# Patient Record
Sex: FEMALE | Race: ASIAN | NOT HISPANIC OR LATINO | Employment: FULL TIME | URBAN - METROPOLITAN AREA
[De-identification: names, ages, dates, MRNs, and addresses within clinical notes are randomized per-mention and may not be internally consistent; named-entity substitution may affect disease eponyms.]

---

## 2017-07-06 ENCOUNTER — OFFICE VISIT (OUTPATIENT)
Dept: FAMILY MEDICINE | Facility: CLINIC | Age: 27
End: 2017-07-06
Payer: COMMERCIAL

## 2017-07-06 VITALS
DIASTOLIC BLOOD PRESSURE: 74 MMHG | HEIGHT: 62 IN | HEART RATE: 119 BPM | WEIGHT: 124 LBS | OXYGEN SATURATION: 100 % | SYSTOLIC BLOOD PRESSURE: 116 MMHG | TEMPERATURE: 98.5 F | BODY MASS INDEX: 22.82 KG/M2

## 2017-07-06 DIAGNOSIS — H66.90 ACUTE OTITIS MEDIA, UNSPECIFIED LATERALITY, UNSPECIFIED OTITIS MEDIA TYPE: Primary | ICD-10-CM

## 2017-07-06 DIAGNOSIS — H61.21 IMPACTED CERUMEN OF RIGHT EAR: ICD-10-CM

## 2017-07-06 DIAGNOSIS — R07.0 THROAT PAIN: ICD-10-CM

## 2017-07-06 PROCEDURE — 69209 REMOVE IMPACTED EAR WAX UNI: CPT | Performed by: PHYSICIAN ASSISTANT

## 2017-07-06 PROCEDURE — 99213 OFFICE O/P EST LOW 20 MIN: CPT | Mod: 25 | Performed by: PHYSICIAN ASSISTANT

## 2017-07-06 RX ORDER — AMOXICILLIN 500 MG/1
500 CAPSULE ORAL 2 TIMES DAILY
Qty: 20 CAPSULE | Refills: 0 | Status: SHIPPED | OUTPATIENT
Start: 2017-07-06 | End: 2017-07-16

## 2017-07-06 NOTE — PROGRESS NOTES
SUBJECTIVE:      Alexsandra Westbrook is a 26 year old female who presents to clinic today with  for the following health issues:         Acute Illness   Acute illness concerns: sore throat, ear pain  Onset: 1 week    Fever: no    Chills/Sweats: no    Headache (location?): no    Sinus Pressure:no    Conjunctivitis:  no    Ear Pain: YES: right    Rhinorrhea: no    Congestion: no    Sore Throat: YES- only when swallowing   Cough: no    Wheeze: no    Decreased Appetite: no    Nausea: no    Vomiting: no    Diarrhea:  no    Dysuria/Freq.: no    Fatigue/Achiness: no    Sick/Strep Exposure: no   Therapies Tried and outcome: none     Sore throat and left ear pain for the last week.  Throat hurts more with swallowing, especially salty and sour foods.  She denies feeling congested or having rhinorrhea, but does note feelings of PND.   Has been sneezing more than usual as well.     Her right ear is painful.   Feels like the pain is internal.   Occasional itching, feels watery, but no discharge.   Does note pressure in ear.   No decreased hearing or drainage.  Last ear infection was in February.   She does note a cold approximately a month ago.     Denies cough, SOB, decreased appetite, nausea and vomiting.   No sick exposures.        Problem list and histories reviewed & adjusted, as indicated.  Additional history: as documented     Patient Active Problem List   Diagnosis     CARDIOVASCULAR SCREENING; LDL GOAL LESS THAN 160     Insomnia     Female infertility     Vitamin D deficiency     PCOS (polycystic ovarian syndrome)     Indication for care in labor or delivery     Postpartum state     Past Surgical History:   Procedure Laterality Date     DILATION AND CURETTAGE SUCTION N/A 4/7/2015    Procedure: DILATION AND CURETTAGE SUCTION;  Surgeon: Hafsa Myers MD;  Location:  OR       Social History   Substance Use Topics     Smoking status: Never Smoker     Smokeless tobacco: Never Used     Alcohol use No     Family  "History   Problem Relation Age of Onset     Family History Negative Mother      Family History Negative Father      DIABETES Maternal Grandmother      Coronary Artery Disease No family hx of      Hypertension No family hx of      Hyperlipidemia No family hx of      CEREBROVASCULAR DISEASE Maternal Grandfather      old age     Breast Cancer No family hx of      Colon Cancer No family hx of      Thyroid Disease Mother      enlarged, hypothyroidism         Current Outpatient Prescriptions   Medication Sig Dispense Refill     amoxicillin (AMOXIL) 500 MG capsule Take 1 capsule (500 mg) by mouth 2 times daily for 10 days 20 capsule 0     norelgestromin-ethinyl estradiol (ORTHO EVRA) 150-35 MCG/24HR patch Place 1 patch onto the skin once a week (Patient not taking: Reported on 7/6/2017) 9 patch 3     Omega-3 Fatty Acids (OMEGA-3 FISH OIL PO)        Prenatal Vit-Fe Fumarate-FA (PRENATAL MULTIVITAMIN  PLUS IRON) 27-0.8 MG TABS        No Known Allergies     Reviewed and updated as needed this visit by clinical staff        Reviewed and updated as needed this visit by Provider           ROS:  Constitutional, HEENT, cardiovascular, pulmonary, gi and gu systems are negative, except as otherwise noted.    OBJECTIVE:     /74  Pulse 119  Temp 98.5  F (36.9  C) (Oral)  Ht 5' 2.25\" (1.581 m)  Wt 124 lb (56.2 kg)  SpO2 100%  BMI 22.5 kg/m2   Body mass index is 22.5 kg/(m^2).  GENERAL: healthy, alert and no distress  EYES: Eyes grossly normal to inspection, PERRL and conjunctivae and sclerae normal  HENT: normal cephalic/atraumatic, right ear: occluded with wax, TM visualized after irrigation and was red, dull, and had effusion, left ear: normal: no effusions, no erythema, normal landmarks, nose and mouth without ulcers or lesions, oropharynx clear, oral mucous membranes moist and cobblestoning of oropharynx.   NECK: no adenopathy, no asymmetry, masses, or scars and thyroid normal to palpation  RESP: lungs clear to " auscultation - no rales, rhonchi or wheezes  CV: regular rate and rhythm, normal S1 S2, no S3 or S4, no murmur, click or rub, no peripheral edema and peripheral pulses strong    Diagnostic Test Results:  none     ASSESSMENT/PLAN:     1. Acute otitis media, unspecified laterality, unspecified otitis media type  - amoxicillin (AMOXIL) 500 MG capsule; Take 1 capsule (500 mg) by mouth 2 times daily for 10 days  Dispense: 20 capsule; Refill: 0    Follow up if symptoms do not improve or worsen.     Hector THEODORE

## 2017-07-06 NOTE — MR AVS SNAPSHOT
"              After Visit Summary   7/6/2017    Alexsandra Westbrook    MRN: [id number]           Patient Information     Date Of Birth          1990        Visit Information        Provider Department      7/6/2017 2:30 PM Elma Hui PA-C; [employer] [facility]        Today's Diagnoses     Acute otitis media, unspecified laterality, unspecified otitis media type    -  1       Follow-ups after your visit        Who to contact     If you have questions or need follow up information about today's clinic visit or your schedule please [facility] directly at [phone].  Normal or non-critical lab and imaging results will be communicated to you by MyChart, letter or phone within 4 business days after the clinic has received the results. If you do not hear from us within 7 days, please contact the clinic through [employer] or phone. If you have a critical or abnormal lab result, we will notify you by phone as soon as possible.  Submit refill requests through [employer] or call your pharmacy and they will forward the refill request to us. Please allow 3 business days for your refill to be completed.          Additional Information About Your Visit        MyChart Information     [employer] gives you secure access to your electronic health record. If you see a primary care provider, you can also send messages to your care team and make appointments. If you have questions, please call your primary care clinic.  If you do not have a primary care provider, please call [phone] and they will assist you.        Care EveryWhere ID     This is your Care EveryWhere ID. This could be used by other organizations to access your [facility] medical records  [id number]        Your Vitals Were     Pulse Temperature Height Pulse Oximetry BMI (Body Mass Index)       119 98.5  F (36.9  C) (Oral) 5' 2.25\" (1.581 m) 100% 22.5 kg/m2        Blood Pressure from Last 3 Encounters:   07/06/17 116/74 "   10/03/16 110/72   06/06/16 118/76    Weight from Last 3 Encounters:   07/06/17 124 lb (56.2 kg)   10/03/16 130 lb (59 kg)   06/06/16 132 lb 8 oz (60.1 kg)              Today, you had the following     No orders found for display         Today's Medication Changes          These changes are accurate as of: 7/6/17  3:38 PM.  If you have any questions, ask your nurse or doctor.               Start taking these medicines.        Dose/Directions    amoxicillin 500 MG capsule   Commonly known as:  AMOXIL   Used for:  Acute otitis media, unspecified laterality, unspecified otitis media type   Started by:  Elma Hui PA-C        Dose:  500 mg   Take 1 capsule (500 mg) by mouth 2 times daily for 10 days   Quantity:  20 capsule   Refills:  0            Where to get your medicines      These medications were sent to [id number] - [place], [place] - [address] AT [place] [address]  [address] [place] [address]     Phone:  [phone]     amoxicillin 500 MG capsule                Primary Care Provider Office Phone # Fax #    Zo Ferrara PA-C [phone]       [facility] [address] [place] [address]        Equal Access to Services     GIO PETERSEN AH: Hadii aad ku hadasho Soomaali, waaxda luqadaha, qaybta kaalmada adeegyada, waxay idiin hayaan stephan kharacurly patel ah. So [facility] [phone].    ATENCIÓN: Si habla español, tiene a barnes disposición servicios gratuitos de asistencia lingüística. Llame al [phone].    We comply with applicable federal civil rights laws and [place] laws. We do not discriminate on the basis of race, color, national origin, age, disability sex, sexual orientation or gender identity.            Thank you!     Thank you for choosing [facility]  for your care. Our goal is always to provide you with excellent care. Hearing back from our patients is one way we can continue to improve our services. Please  take a few minutes to complete the written survey that you may receive in the mail after your visit with us. Thank you!             Your Updated Medication List - Protect others around you: Learn how to safely use, store and throw away your medicines at www.disposemymeds.org.          This list is accurate as of: 7/6/17  3:38 PM.  Always use your most recent med list.                   Brand Name Dispense Instructions for use Diagnosis    amoxicillin 500 MG capsule    AMOXIL    20 capsule    Take 1 capsule (500 mg) by mouth 2 times daily for 10 days    Acute otitis media, unspecified laterality, unspecified otitis media type       norelgestromin-ethinyl estradiol 150-35 MCG/24HR patch    ORTHO EVRA    9 patch    Place 1 patch onto the skin once a week    Encounter for initial prescription of transdermal patch hormonal contraceptive device       OMEGA-3 FISH OIL PO           prenatal multivitamin  plus iron 27-0.8 MG Tabs per tablet

## 2017-07-06 NOTE — NURSING NOTE
"Chief Complaint   Patient presents with     Ear Problem       Initial /74  Pulse 119  Temp 98.5  F (36.9  C) (Oral)  Ht 5' 2.25\" (1.581 m)  Wt 124 lb (56.2 kg)  SpO2 100%  BMI 22.5 kg/m2 Estimated body mass index is 22.5 kg/(m^2) as calculated from the following:    Height as of this encounter: 5' 2.25\" (1.581 m).    Weight as of this encounter: 124 lb (56.2 kg).  Medication Reconciliation: complete     Leena Chaparro MA      "

## 2017-07-06 NOTE — PROGRESS NOTES
SUBJECTIVE:                                                    Alexsandra Westbrook is a 26 year old female who presents to clinic today for the following health issues:    Acute Illness   Acute illness concerns: sore throat, ear pain  Onset: 1 week    Fever: no    Chills/Sweats: no    Headache (location?): no    Sinus Pressure:no    Conjunctivitis:  no    Ear Pain: YES: right    Rhinorrhea: no    Congestion: no    Sore Throat: YES- only when swallowing     Cough: no    Wheeze: no    Decreased Appetite: no    Nausea: no    Vomiting: no    Diarrhea:  no    Dysuria/Freq.: no    Fatigue/Achiness: no    Sick/Strep Exposure: no     Therapies Tried and outcome: none    Patient reports one week history of sore throat and ear pressure  Symptoms have been pretty consistent over the past week.  Pain with swallowing. Salty and sour foods irritate her throat.   Ear itches, but hasn't had discharge. Reports intermittent popping but no hearing loss.   Reports history of ear infection in February.    Sneezing more than usual.    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    Problem list and histories reviewed & adjusted, as indicated.  Additional history: as documented    Patient Active Problem List   Diagnosis     CARDIOVASCULAR SCREENING; LDL GOAL LESS THAN 160     Insomnia     Female infertility     Vitamin D deficiency     PCOS (polycystic ovarian syndrome)     Indication for care in labor or delivery     Postpartum state     Past Surgical History:   Procedure Laterality Date     DILATION AND CURETTAGE SUCTION N/A 4/7/2015    Procedure: DILATION AND CURETTAGE SUCTION;  Surgeon: Hafsa Myers MD;  Location:  OR       Social History   Substance Use Topics     Smoking status: Never Smoker     Smokeless tobacco: Never Used     Alcohol use No     Family History   Problem Relation Age of Onset     Family History Negative Mother      Family History Negative  "Father      DIABETES Maternal Grandmother      Coronary Artery Disease No family hx of      Hypertension No family hx of      Hyperlipidemia No family hx of      CEREBROVASCULAR DISEASE Maternal Grandfather      old age     Breast Cancer No family hx of      Colon Cancer No family hx of      Thyroid Disease Mother      enlarged, hypothyroidism         Current Outpatient Prescriptions   Medication Sig Dispense Refill     amoxicillin (AMOXIL) 500 MG capsule Take 1 capsule (500 mg) by mouth 2 times daily for 10 days 20 capsule 0     norelgestromin-ethinyl estradiol (ORTHO EVRA) 150-35 MCG/24HR patch Place 1 patch onto the skin once a week (Patient not taking: Reported on 7/6/2017) 9 patch 3     Omega-3 Fatty Acids (OMEGA-3 FISH OIL PO)        Prenatal Vit-Fe Fumarate-FA (PRENATAL MULTIVITAMIN  PLUS IRON) 27-0.8 MG TABS        No Known Allergies    Reviewed and updated as needed this visit by clinical staff       Reviewed and updated as needed this visit by Provider         ROS:  Constitutional, HEENT, cardiovascular, pulmonary, gi and gu systems are negative, except as otherwise noted.    OBJECTIVE:     /74  Pulse 119  Temp 98.5  F (36.9  C) (Oral)  Ht 5' 2.25\" (1.581 m)  Wt 124 lb (56.2 kg)  SpO2 100%  BMI 22.5 kg/m2  Body mass index is 22.5 kg/(m^2).  GENERAL: healthy, alert and no distress  EYES: Eyes grossly normal to inspection, PERRL and conjunctivae and sclerae normal  HENT: normal cephalic/atraumatic, right ear: Occluded with wax. TM visualized after irrigation, and is erythematous and dull, nose and mouth without ulcers or lesions, oropharynx clear and oral mucous membranes moist  NECK: no adenopathy, no asymmetry, masses, or scars and thyroid normal to palpation  RESP: lungs clear to auscultation - no rales, rhonchi or wheezes  CV: regular rate and rhythm, normal S1 S2, no S3 or S4, no murmur, click or rub, no peripheral edema and peripheral pulses strong  SKIN: no suspicious lesions or " mike    Diagnostic Test Results:  none     ASSESSMENT/PLAN:     1. Acute otitis media, unspecified laterality, unspecified otitis media type  Patient advised to complete full course of antibiotic. Follow-up if no improvement with treatment.   - amoxicillin (AMOXIL) 500 MG capsule; Take 1 capsule (500 mg) by mouth 2 times daily for 10 days  Dispense: 20 capsule; Refill: 0    2. Impacted cerumen of right ear  Irrigation performed to allow for visualization of R TM, due to R sided ear discomfort. Irrigation successful at clearing impaction. See physical exam.  -HC REMOVAL IMPACTED CERUMEN IRRIGATION/LVG UNILAT    3. Throat pain  Likely related to URI/postnasal drainage. Amoxicillin would cover for strep, so strep testing not performed.    See Patient Instructions    I performed a history and physical examination in addition to that performed by the student. The documentation above reflects my personal history and physical findings.    Elma Hui PA-C  [facility]

## 2017-08-12 ENCOUNTER — HOSPITAL ENCOUNTER (EMERGENCY)
Facility: CLINIC | Age: 27
Discharge: HOME | End: 2017-08-12
Attending: EMERGENCY MEDICINE | Admitting: EMERGENCY MEDICINE
Payer: COMMERCIAL

## 2017-08-12 ENCOUNTER — APPOINTMENT (OUTPATIENT)
Dept: ULTRASOUND IMAGING | Facility: CLINIC | Age: 27
End: 2017-08-12
Attending: EMERGENCY MEDICINE
Payer: COMMERCIAL

## 2017-08-12 VITALS
OXYGEN SATURATION: 100 % | TEMPERATURE: 98.3 F | HEART RATE: 86 BPM | DIASTOLIC BLOOD PRESSURE: 79 MMHG | SYSTOLIC BLOOD PRESSURE: 121 MMHG | RESPIRATION RATE: 16 BRPM

## 2017-08-12 DIAGNOSIS — O20.0 THREATENED MISCARRIAGE IN EARLY PREGNANCY: ICD-10-CM

## 2017-08-12 LAB
B-HCG SERPL-ACNC: 8 IU/L (ref 0–5)
BASOPHILS # BLD AUTO: 0 10E9/L (ref 0–0.2)
BASOPHILS NFR BLD AUTO: 0.4 %
DIFFERENTIAL METHOD BLD: NORMAL
EOSINOPHIL # BLD AUTO: 0.1 10E9/L (ref 0–0.7)
EOSINOPHIL NFR BLD AUTO: 2.6 %
ERYTHROCYTE [DISTWIDTH] IN BLOOD BY AUTOMATED COUNT: 11.8 % (ref 10–15)
HCT VFR BLD AUTO: 40.3 % (ref 35–47)
HGB BLD-MCNC: 13.3 G/DL (ref 11.7–15.7)
IMM GRANULOCYTES # BLD: 0 10E9/L (ref 0–0.4)
IMM GRANULOCYTES NFR BLD: 0.2 %
LYMPHOCYTES # BLD AUTO: 1.9 10E9/L (ref 0.8–5.3)
LYMPHOCYTES NFR BLD AUTO: 38.2 %
MCH RBC QN AUTO: 29.3 PG (ref 26.5–33)
MCHC RBC AUTO-ENTMCNC: 33 G/DL (ref 31.5–36.5)
MCV RBC AUTO: 89 FL (ref 78–100)
MONOCYTES # BLD AUTO: 0.4 10E9/L (ref 0–1.3)
MONOCYTES NFR BLD AUTO: 8 %
NEUTROPHILS # BLD AUTO: 2.6 10E9/L (ref 1.6–8.3)
NEUTROPHILS NFR BLD AUTO: 50.6 %
NRBC # BLD AUTO: 0 10*3/UL
NRBC BLD AUTO-RTO: 0 /100
PLATELET # BLD AUTO: 365 10E9/L (ref 150–450)
RBC # BLD AUTO: 4.54 10E12/L (ref 3.8–5.2)
WBC # BLD AUTO: 5 10E9/L (ref 4–11)

## 2017-08-12 PROCEDURE — 00000159 ZZHCL STATISTIC H-SEND OUTS PREP: Performed by: EMERGENCY MEDICINE

## 2017-08-12 PROCEDURE — 99284 EMERGENCY DEPT VISIT MOD MDM: CPT | Mod: 25

## 2017-08-12 PROCEDURE — 88305 TISSUE EXAM BY PATHOLOGIST: CPT | Performed by: EMERGENCY MEDICINE

## 2017-08-12 PROCEDURE — 36415 COLL VENOUS BLD VENIPUNCTURE: CPT | Performed by: EMERGENCY MEDICINE

## 2017-08-12 PROCEDURE — 84702 CHORIONIC GONADOTROPIN TEST: CPT | Performed by: EMERGENCY MEDICINE

## 2017-08-12 PROCEDURE — 76801 OB US < 14 WKS SINGLE FETUS: CPT

## 2017-08-12 PROCEDURE — 88305 TISSUE EXAM BY PATHOLOGIST: CPT | Mod: 26 | Performed by: EMERGENCY MEDICINE

## 2017-08-12 PROCEDURE — 85025 COMPLETE CBC W/AUTO DIFF WBC: CPT | Performed by: EMERGENCY MEDICINE

## 2017-08-12 ASSESSMENT — ENCOUNTER SYMPTOMS: ABDOMINAL PAIN: 0

## 2017-08-12 NOTE — DISCHARGE INSTRUCTIONS
"Please make an appointment to follow up with your primary care provider OR [facility] [phone] in 2-3 days for repeat HCG and possible ultrasound.    Return to ER immediately if you develop: worsening bleeding (>1 pad or tampon per hour), severe abdominal or pelvic pain, new lightheadedness/dizziness/shortness of breath, Fever > 101, persistent nausea or vomiting OR you have any other concerns about your health.          S?y Audelia Có Th? X?y Ra [Possible Miscarriage, Threatened ]  V ào giai ? o?n ? ?u c?a th?i k? segun [place] ( ba tháng ? ?u), vi?c ch?y máu nh? không ph?i là ?i ?u b?t th? ?ng. ?i ?u này có th? là hoàn toàn bình th ? ?ng. Nh?ng n ?u ch?y máu ida?u ho? c ?au b ?ng tr?m tr?ng, ?ó có th ? là d?u hi?u s?m c?a s? s?y [place] (miscarriage). \"S?y [place]\" ngh?a là m?t [place] k? m?t cách b?t ng?.  Shirin shanelle?ng phân n?a s? b?nh nhân b? ch?y máu ho? c ?au b ?ng v ào giai ? o?n ? ?u c?a th?i k? segun [place], nh?ng tri?u ch?ng này s? ng?ng và [place] k? s? ti?p t?c m?t cách bình th ? ?ng. Ирина nhiên, shirin phân n?a các tr? ?ng h?p, s?y [place] s? x?y ra. S?y [place] có th? x?y ra do ida?u nguyên nhân khác nhau. Nh?ng nguyên nhân này anayeli g?m ida? m trùng ?? ?ng ti?u ho?c ida?m trùng vùng ch?u, [place] ida phát tri?n b?t bình th ? ?ng, cú ?á nh trúng vùng b?ng phía d? ?i (d? dày). Shirin ph?n l? n tr? ?ng h?p, không th? tìm ra nguyên nhân nào. Hãy yên tâm r? ng ? i?u này không ph?i là k?t qu? c?a b?t c? vi?c gì mà quý v? ? ã làm nate và s? không can thi?p vào kh? n?ng segun [place] c ?a quý v? shirin t??ng martínez .  Ch?m Sóc T  ?I BRIDGETTE: ? ? c?i thi? n c? may gi ? l?i cái [place] này, quý v? ph?i th?c hi?n nh? ng ?i ? u gerardo ?ây :  1. N?m ngh? trên gi? ?ng cho ? ?n khi h? t ?au và ch ?y máu.  2. Tránh giao h?p shirin vòng 3 tu?n l? k? ti?p.  3. ? ?ng dùng vòi r?a âm ? ?o ho?c dùng b ? ng v? sinh b?ng bông th?m ? ?t vào âm ? ?o.  Ti?p T?c [place] Dõi  L?y h?n v?i bác s? c?a quý v? shirin tu?n l? k? ti?p, ho?c [place] s? h? ?ng " d?n c?a nhân viên chúng tôi.  [L?U Ý: N?u quý v? ? ã siêu âm, bác s? chuyên angela s? xem l?i vi?c này. Quý v? s? ?? ?c thông báo v? b?t k? khám phá m?i nào có th? ? nh h? ? ng ? ?n vi?c ch ?m sóc c ?a quý v?.]  N?u x?y ra b?t c? ?i?u nào gerardo ?ây hãy L?P T?C ?I?U TR? Y T?:    Ch?y máu âm ? ?o ho? c ?au quá ba ngày    Ch?y máu âm ? ?o ida?u (th?m m?t mi? ng b?ng m ?i gi? shirin vòng ba gi?)    Sô?t 100.4 F (38 C) ho??c lundberg h?n, ho??c dante chi? dâ?n cu?a ba?c si? cu?a erica? vi?    ? au ida?u    Y?u ?t, chóng m?t ho?c ng?t x?u    Ti?t ra b?t c? ch?t gì gi? ng nh? d ?ch mô: màng màu h?ng ho?c xám ho?c ch?t r?n (l? y và segun ? ?n bác s?)  Date Last Reviewed: 4/21/2014 2000-2017 [employer]. [address] [place] [address], [place] [address]. All rights reserved. This information is not intended as a substitute for professional medical care. Always follow your healthcare professional's instructions.

## 2017-08-12 NOTE — ED AVS SNAPSHOT
" [facility] Emergency Department    [address]    [facility] [address]    Phone:  [phone]    Fax:  [phone]                                       Alexsandra Westbrook   MRN: [id number]    Department:  [facility] Emergency Department   Date of Visit:  2017           Patient Information     Date Of Birth          1990        Your diagnoses for this visit were:     Threatened miscarriage in early pregnancy        You were seen by Charly Dillard MD.        Discharge Instructions       Please make an appointment to follow up with your primary care provider OR [facility] [phone] in 2-3 days for repeat HCG and possible ultrasound.    Return to ER immediately if you develop: worsening bleeding (>1 pad or tampon per hour), severe abdominal or pelvic pain, new lightheadedness/dizziness/shortness of breath, Fever > 101, persistent nausea or vomiting OR you have any other concerns about your health.          S?y Audelia Có Th? X?y Ra [Possible Miscarriage, Threatened ]  V ào giai ? o?n ? ?u c?a th?i k? segun British ( ba tháng ? ?u), vi?c ch?y máu nh? không ph?i là ?i ?u b?t th? ?ng. ?i ?u này có th? là hoàn toàn bình th ? ?ng. Nh?ng n ?u ch?y máu ida?u ho? c ?au b ?ng tr?m tr?ng, ?ó có th ? là d?u hi?u s?m c?a s? s?y British (miscarriage). \"S?y British\" ngh?a là m?t British k? m?t cách b?t ng?.  Shirin shanelle?ng phân n?a s? b?nh nhân b? ch?y máu ho? c ?au b ?ng v ào giai ? o?n ? ?u c?a th?i k? segun British, nh?ng tri?u ch?ng này s? ng?ng và British k? s? ti?p t?c m?t cách bình th ? ?ng. Ирина nhiên, shirin phân n?a các tr? ?ng h?p, s?y British s? x?y ra. S?y British có th? x?y ra do ida?u nguyên nhân khác nhau. Nh?ng nguyên nhân này anayeli g?m ida? m trùng ?? ?ng ti?u ho?c ida?m trùng vùng ch?u, British ida phát tri?n b?t bình th ? ?ng, cú ?á nh trúng vùng b?ng phía d? ?i (d? dày). Shirin ph?n l? n tr? ?ng h?p, không th? tìm ra nguyên nhân nào. Tavo pyle r? ng ? i?u này " không ph?i là k?t qu? c?a b?t c? vi?c gì mà quý v? ? ã làm nate và s? không can thi?p vào kh? n?ng segun Moz[place]ican c ?a quý v? shirin t??ng martínez .  Ch?m Sóc T  ?I BRIDGETTE: ? ? c?i thi? n c? may gi ? l?i cái Mozambican này, quý v? ph?i th?c hi?n nh? ng ?i ? u gerardo ?ây :  1. N?m ngh? trên gi? ?ng cho ? ?n khi h? t ?au và ch ?y máu.  2. Tránh giao h?p shirin vòng 3 tu?n l? k? ti?p.  3. ? ?ng dùng vòi r?a âm ? ?o ho?c dùng b ? ng v? sinh b?ng bông th?m ? ?t vào âm ? ?o.  Ti?p T?c [place] Dõi  L?y h?n v?i bác s? c?a quý v? shirin tu?n l? k? ti?p, ho?c [place] s? h? ?ng d?n c?a nhân viwicho foster joseph.  [L?U Ý: N?u quý v? ? ã siêu âm, bác s? chuyên angela s? xem l?i vi?c này. Quý v? s? ?? ?c thông báo v? b?t k? khám phá m?i nào có th? ? nh h? ? ng ? ?n vi?c ch ?m sóc c ?a quý v?.]  N?u x?y ra b?t c? ?i?u nào gerardo ?ây hãy L?P T?C ?I?U TR? Y T?:    Ch?y máu âm ? ?o ho? c ?au quá ba ngày    Ch?y máu âm ? ?o ida?u (th?m m?t mi? ng b?ng m ?i gi? shirin vòng ba gi?)    Sô?t 100.4 F (38 C) ho??c lundberg h?n, ho??c [place] chi? dâ?n cu?a ba?c si? cu?a erica? vi?    ? au ida?u    Y?u ?t, chóng m?t ho?c ng?t x?u    Ti?t ra b?t c? ch?t gì gi? ng nh? d ?ch mô: màng màu h?ng ho?c xám ho?c ch?t r?n (l? y và segun ? ?n bác s?)  Date Last Reviewed: 4/21/2014 2000-2017 [employer] [address] [place] [address]. All rights reserved. This information is not intended as a substitute for professional medical care. Always follow your healthcare professional's instructions.            24 Hour Appointment Hotline       To make an appointment at any [facility], call [phone]). If you don't have a family doctor or clinic, we will help you find one. [facility] are conveniently located to serve the needs of you and your family.             Review of your medicines      Our records show that you are taking the medicines listed below. If these are incorrect, please call your family doctor or clinic.        Dose / Directions Last dose  taken    norelgestromin-ethinyl estradiol 150-35 MCG/24HR patch   Commonly known as:  ORTHO EVRA   Dose:  1 patch   Quantity:  9 patch        Place 1 patch onto the skin once a week   Refills:  3        OMEGA-3 FISH OIL PO        Refills:  0        prenatal multivitamin plus iron 27-0.8 MG Tabs per tablet        Refills:  0                Procedures and tests performed during your visit     CBC with platelets differential    HCG quantitative pregnancy    OB  US 1st trimester w transvag    Surgical Path Exam      Orders Needing Specimen Collection     None      Pending Results     No orders found from 8/10/2017 to 8/13/2017.            Pending Culture Results     No orders found from 8/10/2017 to 8/13/2017.            Pending Results Instructions     If you had any lab results that were not finalized at the time of your Discharge, you can call the ED Lab Result RN at [phone]. You will be contacted by this team for any positive Lab results or changes in treatment. The nurses are available 7 days a week from 10A to 6:30P.  You can leave a message 24 hours per day and they will return your call.        Test Results From Your Hospital Stay        8/12/2017 11:09 AM      Component Results     Component Value Ref Range & Units Status    WBC 5.0 4.0 - 11.0 10e9/L Final    RBC Count 4.54 3.8 - 5.2 10e12/L Final    Hemoglobin 13.3 11.7 - 15.7 g/dL Final    Hematocrit 40.3 35.0 - 47.0 % Final    MCV 89 78 - 100 fl Final    MCH 29.3 26.5 - 33.0 pg Final    MCHC 33.0 31.5 - 36.5 g/dL Final    RDW 11.8 10.0 - 15.0 % Final    Platelet Count 365 150 - 450 10e9/L Final    Diff Method Automated Method  Final    % Neutrophils 50.6 % Final    % Lymphocytes 38.2 % Final    % Monocytes 8.0 % Final    % Eosinophils 2.6 % Final    % Basophils 0.4 % Final    % Immature Granulocytes 0.2 % Final    Nucleated RBCs 0 0 /100 Final    Absolute Neutrophil 2.6 1.6 - 8.3 10e9/L Final    Absolute Lymphocytes 1.9 0.8 - 5.3 10e9/L Final     Absolute Monocytes 0.4 0.0 - 1.3 10e9/L Final    Absolute Eosinophils 0.1 0.0 - 0.7 10e9/L Final    Absolute Basophils 0.0 0.0 - 0.2 10e9/L Final    Abs Immature Granulocytes 0.0 0 - 0.4 10e9/L Final    Absolute Nucleated RBC 0.0  Final         8/12/2017 11:30 AM      Component Results     Component Value Ref Range & Units Status    HCG Quantitative Serum 8 (H) 0 - 5 IU/L Final         8/12/2017 12:04 PM      Narrative     US OB <14 WEEKS WITH TRANSVAGINAL SINGLE 8/12/2017 12:00 PM    CLINICAL HISTORY: Pain and vaginal bleeding.      TECHNIQUE: Transabdominal scan is performed.     COMPARISON: None.     LMP: Unknown.     FINDINGS: There is a 4 mm cystic structure seen within the  endometrium, possibly a gestational sac. If so, this would correspond  to a gestational age of approximately 5 weeks.    No evidence of hemorrhage or free fluid.        Impression     IMPRESSION:   4 mm cystic structure in the endometrium, which, if representative of  a gestational sac, which corresponds to a gestational age of  approximately 5 weeks. Recommend clinical correlation and follow-up  imaging is warranted.    GARRETT TAO MD                Clinical Quality Measure: Blood Pressure Screening     Your blood pressure was checked while you were in the emergency department today. The last reading we obtained was  BP: 118/75 . Please read the guidelines below about what these numbers mean and what you should do about them.  If your systolic blood pressure (the top number) is less than 120 and your diastolic blood pressure (the bottom number) is less than 80, then your blood pressure is normal. There is nothing more that you need to do about it.  If your systolic blood pressure (the top number) is 120-139 or your diastolic blood pressure (the bottom number) is 80-89, your blood pressure may be higher than it should be. You should have your blood pressure rechecked within a year by a primary care provider.  If your systolic blood  pressure (the top number) is 140 or greater or your diastolic blood pressure (the bottom number) is 90 or greater, you may have high blood pressure. High blood pressure is treatable, but if left untreated over time it can put you at risk for heart attack, stroke, or kidney failure. You should have your blood pressure rechecked by a primary care provider within the next 4 weeks.  If your provider in the emergency department today gave you specific instructions to follow-up with your doctor or provider even sooner than that, you should follow that instruction and not wait for up to 4 weeks for your follow-up visit.        Thank you for choosing [facility]       Thank you for choosing [facility] for your care. Our goal is always to provide you with excellent care. Hearing back from our patients is one way we can continue to improve our services. Please take a few minutes to complete the written survey that you may receive in the mail after you visit with us. Thank you!        [employer] Information     [employer] gives you secure access to your electronic health record. If you see a primary care provider, you can also send messages to your care team and make appointments. If you have questions, please call your primary care clinic.  If you do not have a primary care provider, please call [phone] and they will assist you.        Care EveryWhere ID     This is your Care EveryWhere ID. This could be used by other organizations to access your [facility] medical records  [id number]        Equal Access to Services     MEY PETERSEN : Hadii segun Knutson, waaxda luqadaha, qaybta kaalmada oz, rekha marsh. So [facility] [phone].    ATENCIÓN: Si habla español, tiene a barnes disposición servicios gratuitos de asistencia lingüística. Llame al [phone].    We comply with applicable federal civil rights laws and [place] laws. We do not discriminate on the basis of race, color, national  origin, age, disability sex, sexual orientation or gender identity.            After Visit Summary       This is your record. Keep this with you and show to your community pharmacist(s) and doctor(s) at your next visit.

## 2017-08-12 NOTE — ED NOTES
A&Ox3, ABC's intact  Pt states that she is pregnant, not sure how far along started having vaginal bleeding last night.  PMH: See hx  Meds; Denies.

## 2017-08-12 NOTE — ED AVS SNAPSHOT
[facility] Emergency Department    [address]    [facility] [address]    Phone:  [phone]    Fax:  [phone]                                       Alexsandra Westbrook   MRN: [id number]    Department:  [facility] Emergency Department   Date of Visit:  8/12/2017           After Visit Summary Signature Page     I have received my discharge instructions, and my questions have been answered. I have discussed any challenges I see with this plan with the nurse or doctor.    ..........................................................................................................................................  Patient/Patient Representative Signature      ..........................................................................................................................................  Patient Representative Print Name and Relationship to Patient    ..................................................               ................................................  Date                                            Time    ..........................................................................................................................................  Reviewed by Signature/Title    ...................................................              ..............................................  Date                                                            Time

## 2017-08-15 ENCOUNTER — TELEPHONE (OUTPATIENT)
Dept: PEDIATRICS | Facility: CLINIC | Age: 27
End: 2017-08-15

## 2017-08-15 LAB — COPATH REPORT: NORMAL

## 2017-08-15 NOTE — TELEPHONE ENCOUNTER
Call from pathology:    Vaginal specimen showed only Squamous cells, mucous, bacteria.    No products of conception, no blood in specimen.

## 2017-08-17 ENCOUNTER — OFFICE VISIT (OUTPATIENT)
Dept: OBGYN | Facility: CLINIC | Age: 27
End: 2017-08-17
Payer: COMMERCIAL

## 2017-08-17 VITALS
DIASTOLIC BLOOD PRESSURE: 60 MMHG | WEIGHT: 123.4 LBS | BODY MASS INDEX: 22.39 KG/M2 | SYSTOLIC BLOOD PRESSURE: 110 MMHG | TEMPERATURE: 98.3 F

## 2017-08-17 DIAGNOSIS — Z87.59 MISCARRIAGE WITHIN LAST 12 MONTHS: Primary | ICD-10-CM

## 2017-08-17 DIAGNOSIS — E28.2 PCOS (POLYCYSTIC OVARIAN SYNDROME): ICD-10-CM

## 2017-08-17 PROCEDURE — 36415 COLL VENOUS BLD VENIPUNCTURE: CPT | Performed by: ADVANCED PRACTICE MIDWIFE

## 2017-08-17 PROCEDURE — 99213 OFFICE O/P EST LOW 20 MIN: CPT | Performed by: ADVANCED PRACTICE MIDWIFE

## 2017-08-17 PROCEDURE — 84702 CHORIONIC GONADOTROPIN TEST: CPT | Performed by: ADVANCED PRACTICE MIDWIFE

## 2017-08-17 RX ORDER — MEDROXYPROGESTERONE ACETATE 10 MG
10 TABLET ORAL DAILY
Qty: 10 TABLET | Refills: 0 | Status: SHIPPED | OUTPATIENT
Start: 2017-08-17 | End: 2017-12-05

## 2017-08-17 NOTE — PROGRESS NOTES
Patient is here today with an .    Alexsandra Westbrook is a 26 year old female  at 5 weeks gestation by LMP. She presents for follow up appointment after being seen in ER on 08/12/17 for vaginal bleeding. She had positive pregnancy test a few days prior to going to ER.  Ultrasound performed in ER showed small cystic structure within uterus which could be consistent with 4 week gestation. Quant HCG on that day was 8. She was discharged to home and advised to have close follow up. She continued to have heavier bleeding and some cramping. Took another home pregnancy test yesterday, which was negative.  Patient is currently bleeding. Light spotting  Patient is not cramping/having abdominal pain.  Reassured her that the loss could not have been stopped by her actions.    Patient Active Problem List   Diagnosis     CARDIOVASCULAR SCREENING; LDL GOAL LESS THAN 160     Insomnia     Vitamin D deficiency     PCOS (polycystic ovarian syndrome)     Past Medical History:   Diagnosis Date     LSIL (low grade squamous intraepithelial lesion) on Pap smear 7/2014    age 23     PCOS (polycystic ovarian syndrome)      Vitamin D deficiency      Past Surgical History:   Procedure Laterality Date     DILATION AND CURETTAGE SUCTION N/A 4/7/2015    Procedure: DILATION AND CURETTAGE SUCTION;  Surgeon: Hafsa Myers MD;  Location: [facility] OR     Current Outpatient Prescriptions   Medication Sig Dispense Refill     norelgestromin-ethinyl estradiol (ORTHO EVRA) 150-35 MCG/24HR patch Place 1 patch onto the skin once a week (Patient not taking: Reported on 7/6/2017) 9 patch 3     Omega-3 Fatty Acids (OMEGA-3 FISH OIL PO)        Prenatal Vit-Fe Fumarate-FA (PRENATAL MULTIVITAMIN  PLUS IRON) 27-0.8 MG TABS        No Known Allergies    Health maintenance updated:  yes    C: NEGATIVE for fever, chills, change in weight  GI: NEGATIVE for abdominal pain, heartburn, or change in bowel habits  : NEGATIVE for unusual urinary or vaginal symptoms. Periods  are irregular.    POSITIVE for light spotting  N: NEGATIVE for weakness, dizziness or paresthesias  E: NEGATIVE for temperature intolerance, skin/hair changes  H: NEGATIVE for bleeding problems  P: NEGATIVE for changes in mood or affect       /60 (BP Location: Left arm, Patient Position: Chair, Cuff Size: Adult Regular)  Temp 98.3  F (36.8  C) (Oral)  Wt 123 lb 6.4 oz (56 kg)  BMI 22.39 kg/m2  Exam:  Constitutional: healthy, alert and no distress  Respiratory: respirations even and unlabored  Psychiatric: mentation appears normal and affect normal/bright      A  Assess need for Rhogam:  NO, blood type is A+      ASSESSMENT/PLAN:   (Z87.59) Miscarriage within last 12 months  (primary encounter diagnosis)  Comment: Probable complete AB as urine hcg negative yesterday.  Plan: HCG, quantitative, pregnancy        pending    (E28.2) PCOS (polycystic ovarian syndrome)  Comment: causes irregular menses, usually recommend that patient wait two normal cycles until they attempt another pregnancy.   Plan: medroxyPROGESTERone (PROVERA) 10 MG tablet        Patient to take provera challenge in ~ 4 weeks. After she has a withdrawal bleed, is OK to try again to become pregnant.        COUNSELING:    Discussed expectations of bleeding and options and indications for D&C in relation to miscarriage.      Discussed causes of miscarriage including chromosome abnormalities. Understands that nothing can be done to prevent a miscarriage from occuring.       Will call or present to the emergency room if develops heavy bleeding saturating a maxi pad more frequently than every hour or passing large clots.       If bleeding longer than one week or it is heavy, recommended she follow-up for possible D&C by calling the clinic    Can use Ibuprofen for the cramping, up to 800mg every 8 hours.      Encouraged good self care and utilizing support system    15 minutes was spent face to face with the patient today discussing her history,  diagnosis, and follow-up plan as noted above. Over 50% of the visit was spent in counseling and coordination of care.    Total Visit Time: 25 minutes.   KIANA Castro, PATRICIAM

## 2017-08-17 NOTE — MR AVS SNAPSHOT
After Visit Summary   8/17/2017    Alexsandra Westbrook    MRN: [id number]           Patient Information     Date Of Birth          1990        Visit Information        Provider Department      8/17/2017 10:15 AM Faye Dickinson CNM; [employer] [facility]        Today's Diagnoses     Miscarriage within last 12 months    -  1    PCOS (polycystic ovarian syndrome)          Care Instructions    Miscarriage Information    There are three ways to approach a miscarriage. expectant management, medication, or a D&C procedure. You can choose whichever method you feel is appropriate for you.     Expectant Management:  Letting nature take its course, it is more than 99% effective if pregnancy is less than 6 weeks. This process may take days or weeks to start.    Medication:  Misoprostol is inserted vaginally and will cause cramping/bleeding. It is very safe, 99% effective, and takes about 6-12 hours. This can be done in the comfort of your home. This method can be used initially or if expectant management is taking longer than you are comfortable with.     If you choose either of the above methods these are things to watch for and instructions.    Bleeding or spotting is normal and it may begin as brown or pink and turn bright red.  However if you are bleeding more than a pad in an hour for more than 4-5 hours in a row, passing clots the size of a walnut, or you are feeling light headed or dizzy and feel as though you may faint you need to seek immediate medical attention by going to the nearest emergency room or calling 911.      The active portion of a miscarriage may be short or take up to 6-8 hours. The bleeding/cramping will often reach a peak and you may pass tissue and clots.       You will also experience cramping that may be mild or strong. You may experience back pain as well.  You may take acetaminophen 1000 mg every 6 hours 4 times a day or you may take ibuprofen up to  800 mg 3 times a day.         Don't have sex or use tampons for two weeks.      Schedule an appointment to be seen 2-3 weeks after the miscarriage for lab testing and evaluation      Call if you resume bright red bleeding or have a temperature of 100.4 or higher.       You most likely will have bleeding like a period for several weeks following the miscarriage and depending on your normal cycle length will ovulate or be fertile as early as two weeks after the miscarriage.        It is recommended to use some form of contraception for at least the first month after a miscarriage.    D&C Procedure: This procedure is performed by an OB/GYN from our clinic. It can be decided upon immediately or used if other methods fail. It may also be used if your bleeding is very heavy or persists. This procedure is done under local anesthesia; it takes about 5-10 minutes. Cramping is usually strong during the procedure and minimal afterwards. There is usually very little bleeding after the procedure. It is more than 99% effective. If you feel like this is the best option for you, we will have you meet with one of our obstetricians to further discuss the procedure.          Follow-ups after your visit        Follow-up notes from your care team     Return if symptoms worsen or fail to improve.      Who to contact     If you have questions or need follow up information about today's clinic visit or your schedule please contact [facility] directly at [phone].  Normal or non-critical lab and imaging results will be communicated to you by MyChart, letter or phone within 4 business days after the clinic has received the results. If you do not hear from us within 7 days, please contact the clinic through MyChart or phone. If you have a critical or abnormal lab result, we will notify you by phone as soon as possible.  Submit refill requests through [facility] or call your pharmacy and they will forward the refill request  to us. Please allow 3 business days for your refill to be completed.          Additional Information About Your Visit        [facility] Information     [employer] gives you secure access to your electronic health record. If you see a primary care provider, you can also send messages to your care team and make appointments. If you have questions, please call your primary care clinic.  If you do not have a primary care provider, please call [phone] and they will assist you.        Care EveryWhere ID     This is your Care EveryWhere ID. This could be used by other organizations to access your [facility] medical records  [id number]        Your Vitals Were     Temperature BMI (Body Mass Index)                98.3  F (36.8  C) (Oral) 22.39 kg/m2           Blood Pressure from Last 3 Encounters:   08/17/17 110/60   08/12/17 121/79   07/06/17 116/74    Weight from Last 3 Encounters:   08/17/17 123 lb 6.4 oz (56 kg)   07/06/17 124 lb (56.2 kg)   10/03/16 130 lb (59 kg)              We Performed the Following     HCG, quantitative, pregnancy          Today's Medication Changes          These changes are accurate as of: 8/17/17  1:24 PM.  If you have any questions, ask your nurse or doctor.               Start taking these medicines.        Dose/Directions    medroxyPROGESTERone 10 MG tablet   Commonly known as:  PROVERA   Used for:  PCOS (polycystic ovarian syndrome)   Started by:  Faye Dickinson CNM        Dose:  10 mg   Take 1 tablet (10 mg) by mouth daily   Quantity:  10 tablet   Refills:  0            Where to get your medicines      These medications were sent to [facility] - [place], [place] - [address] [facility] [address] [address]     Phone:  [phone]     medroxyPROGESTERone 10 MG tablet                Primary Care Provider Office Phone # Fax #    Josh Sanchez [phone] [address] [place] [address]        Equal Access to  Services     [facility]: Hadii aad ku hadyuanuday Knutson, wabarbarada luqadaha, qaybta kaphilipnetta clinton, rekha marsh. So [facility] [phone].    ATENCIÓN: Si habla kendall, tiene a barnes disposición servicios gratuitos de asistencia lingüística. Llame al [phone].    We comply with applicable federal civil rights laws and [place] laws. We do not discriminate on the basis of race, color, national origin, age, disability sex, sexual orientation or gender identity.            Thank you!     Thank you for choosing [facility]  for your care. Our goal is always to provide you with excellent care. Hearing back from our patients is one way we can continue to improve our services. Please take a few minutes to complete the written survey that you may receive in the mail after your visit with us. Thank you!             Your Updated Medication List - Protect others around you: Learn how to safely use, store and throw away your medicines at www.disposemymeds.org.          This list is accurate as of: 8/17/17  1:24 PM.  Always use your most recent med list.                   Brand Name Dispense Instructions for use Diagnosis    medroxyPROGESTERone 10 MG tablet    PROVERA    10 tablet    Take 1 tablet (10 mg) by mouth daily    PCOS (polycystic ovarian syndrome)       norelgestromin-ethinyl estradiol 150-35 MCG/24HR patch    ORTHO EVRA    9 patch    Place 1 patch onto the skin once a week    Encounter for initial prescription of transdermal patch hormonal contraceptive device       OMEGA-3 FISH OIL PO           prenatal multivitamin plus iron 27-0.8 MG Tabs per tablet

## 2017-08-17 NOTE — PATIENT INSTRUCTIONS
Miscarriage Information    There are three ways to approach a miscarriage. expectant management, medication, or a D&C procedure. You can choose whichever method you feel is appropriate for you.     Expectant Management:  Letting nature take its course, it is more than 99% effective if pregnancy is less than 6 weeks. This process may take days or weeks to start.    Medication:  Misoprostol is inserted vaginally and will cause cramping/bleeding. It is very safe, 99% effective, and takes about 6-12 hours. This can be done in the comfort of your home. This method can be used initially or if expectant management is taking longer than you are comfortable with.     If you choose either of the above methods these are things to watch for and instructions.    Bleeding or spotting is normal and it may begin as brown or pink and turn bright red.  However if you are bleeding more than a pad in an hour for more than 4-5 hours in a row, passing clots the size of a walnut, or you are feeling light headed or dizzy and feel as though you may faint you need to seek immediate medical attention by going to the nearest emergency room or calling 911.      The active portion of a miscarriage may be short or take up to 6-8 hours. The bleeding/cramping will often reach a peak and you may pass tissue and clots.       You will also experience cramping that may be mild or strong. You may experience back pain as well.  You may take acetaminophen 1000 mg every 6 hours 4 times a day or you may take ibuprofen up to 800 mg 3 times a day.         Don't have sex or use tampons for two weeks.      Schedule an appointment to be seen 2-3 weeks after the miscarriage for lab testing and evaluation      Call if you resume bright red bleeding or have a temperature of 100.4 or higher.       You most likely will have bleeding like a period for several weeks following the miscarriage and depending on your normal cycle length will ovulate or be fertile as early  as two weeks after the miscarriage.        It is recommended to use some form of contraception for at least the first month after a miscarriage.    D&C Procedure: This procedure is performed by an OB/GYN from our clinic. It can be decided upon immediately or used if other methods fail. It may also be used if your bleeding is very heavy or persists. This procedure is done under local anesthesia; it takes about 5-10 minutes. Cramping is usually strong during the procedure and minimal afterwards. There is usually very little bleeding after the procedure. It is more than 99% effective. If you feel like this is the best option for you, we will have you meet with one of our obstetricians to further discuss the procedure.

## 2017-08-18 LAB — B-HCG SERPL-ACNC: <1 IU/L (ref 0–5)

## 2017-12-05 ENCOUNTER — OFFICE VISIT (OUTPATIENT)
Dept: FAMILY MEDICINE | Facility: CLINIC | Age: 27
End: 2017-12-05
Payer: COMMERCIAL

## 2017-12-05 VITALS
TEMPERATURE: 98.4 F | OXYGEN SATURATION: 99 % | DIASTOLIC BLOOD PRESSURE: 64 MMHG | HEART RATE: 96 BPM | HEIGHT: 62 IN | WEIGHT: 127.9 LBS | SYSTOLIC BLOOD PRESSURE: 99 MMHG | BODY MASS INDEX: 23.53 KG/M2

## 2017-12-05 DIAGNOSIS — Z00.00 ROUTINE GENERAL MEDICAL EXAMINATION AT A HEALTH CARE FACILITY: Primary | ICD-10-CM

## 2017-12-05 DIAGNOSIS — Z23 NEED FOR PROPHYLACTIC VACCINATION AND INOCULATION AGAINST INFLUENZA: ICD-10-CM

## 2017-12-05 PROBLEM — B96.81 HELICOBACTER PYLORI GASTRITIS: Status: ACTIVE | Noted: 2017-04-17

## 2017-12-05 PROBLEM — E01.0 THYROMEGALY: Status: ACTIVE | Noted: 2017-04-17

## 2017-12-05 PROBLEM — N92.6 IRREGULAR PERIODS/MENSTRUAL CYCLES: Status: ACTIVE | Noted: 2017-04-17

## 2017-12-05 PROBLEM — K29.70 HELICOBACTER PYLORI GASTRITIS: Status: ACTIVE | Noted: 2017-04-17

## 2017-12-05 LAB
BETA HCG QUAL IFA URINE: NEGATIVE
CHOLEST SERPL-MCNC: 192 MG/DL
ERYTHROCYTE [DISTWIDTH] IN BLOOD BY AUTOMATED COUNT: 12 % (ref 10–15)
HBA1C MFR BLD: 5.5 % (ref 4.3–6)
HCG SERPL QL: NEGATIVE
HCT VFR BLD AUTO: 43.7 % (ref 35–47)
HDLC SERPL-MCNC: 56 MG/DL
HGB BLD-MCNC: 14.8 G/DL (ref 11.7–15.7)
LDLC SERPL CALC-MCNC: 113 MG/DL
MCH RBC QN AUTO: 29.8 PG (ref 26.5–33)
MCHC RBC AUTO-ENTMCNC: 33.9 G/DL (ref 31.5–36.5)
MCV RBC AUTO: 88 FL (ref 78–100)
NONHDLC SERPL-MCNC: 136 MG/DL
PLATELET # BLD AUTO: 338 10E9/L (ref 150–450)
RBC # BLD AUTO: 4.97 10E12/L (ref 3.8–5.2)
TRIGL SERPL-MCNC: 114 MG/DL
WBC # BLD AUTO: 4.9 10E9/L (ref 4–11)

## 2017-12-05 PROCEDURE — 99395 PREV VISIT EST AGE 18-39: CPT | Mod: 25 | Performed by: FAMILY MEDICINE

## 2017-12-05 PROCEDURE — 90471 IMMUNIZATION ADMIN: CPT | Performed by: FAMILY MEDICINE

## 2017-12-05 PROCEDURE — 80061 LIPID PANEL: CPT | Performed by: FAMILY MEDICINE

## 2017-12-05 PROCEDURE — 83036 HEMOGLOBIN GLYCOSYLATED A1C: CPT | Performed by: FAMILY MEDICINE

## 2017-12-05 PROCEDURE — 90686 IIV4 VACC NO PRSV 0.5 ML IM: CPT | Performed by: FAMILY MEDICINE

## 2017-12-05 PROCEDURE — 84703 CHORIONIC GONADOTROPIN ASSAY: CPT | Performed by: FAMILY MEDICINE

## 2017-12-05 PROCEDURE — 36415 COLL VENOUS BLD VENIPUNCTURE: CPT | Performed by: FAMILY MEDICINE

## 2017-12-05 PROCEDURE — 85027 COMPLETE CBC AUTOMATED: CPT | Performed by: FAMILY MEDICINE

## 2017-12-05 NOTE — PROGRESS NOTES
SUBJECTIVE:   CC: Alexsandra Westbrook is an 27 year old woman who presents for preventive health visit.     Healthy Habits:    Do you get at least three servings of calcium containing foods daily (dairy, green leafy vegetables, etc.)? yes    Amount of exercise or daily activities, outside of work: None     Problems taking medications regularly No    Medication side effects: No    Have you had an eye exam in the past two years? yes    Do you see a dentist twice per year? Once   Do you have sleep apnea, excessive snoring or daytime drowsiness?no    Pregnancy Test - ?positive at home. UPT negative in clinic.    Today's PHQ-2 Score:   PHQ-2 ( 1999 Pfizer) 12/5/2017 10/3/2016   Q1: Little interest or pleasure in doing things 0 0   Q2: Feeling down, depressed or hopeless 0 0   PHQ-2 Score 0 0         Abuse: Current or Past(Physical, Sexual or Emotional)- No  Do you feel safe in your environment - Yes  Social History   Substance Use Topics     Smoking status: Never Smoker     Smokeless tobacco: Never Used     Alcohol use No     No alcohol use     Reviewed orders with patient.  Reviewed health maintenance and updated orders accordingly - Yes  BP Readings from Last 3 Encounters:   12/05/17 99/64   08/17/17 110/60   08/12/17 121/79    Wt Readings from Last 3 Encounters:   12/05/17 127 lb 14.4 oz (58 kg)   08/17/17 123 lb 6.4 oz (56 kg)   07/06/17 124 lb (56.2 kg)                      Mammogram not appropriate for this patient based on age.    Pertinent mammograms are reviewed under the imaging tab.  History of abnormal Pap smear: YES - other categories - see link Cervical Cytology Screening Guidelines  Last 3 Pap Results:   PAP (no units)   Date Value   10/03/2016 NIL   07/13/2015 NIL   07/09/2014 LSIL (A)         Reviewed and updated as needed this visit by clinical staffTobacco  Allergies  Meds  Med Hx  Surg Hx  Fam Hx  Soc Hx        Reviewed and updated as needed this visit by Provider        Past Medical History:   Diagnosis  "Date     LSIL (low grade squamous intraepithelial lesion) on Pap smear 7/2014    age 23     PCOS (polycystic ovarian syndrome)      Vitamin D deficiency       Past Surgical History:   Procedure Laterality Date     DILATION AND CURETTAGE SUCTION N/A 4/7/2015    Procedure: DILATION AND CURETTAGE SUCTION;  Surgeon: Hafsa Myers MD;  Location: [facility] OR       ROS:  C: NEGATIVE for fever, chills, change in weight  I: NEGATIVE for worrisome rashes, moles or lesions  E: NEGATIVE for vision changes or irritation  ENT: NEGATIVE for ear, mouth and throat problems  R: NEGATIVE for significant cough or SOB  CV: NEGATIVE for chest pain, palpitations or peripheral edema  GI: NEGATIVE for nausea, abdominal pain, heartburn, or change in bowel habits  M: NEGATIVE for significant arthralgias or myalgia  N: NEGATIVE for weakness, dizziness or paresthesias  P: NEGATIVE for changes in mood or affect    OBJECTIVE:   BP 99/64  Pulse 96  Temp 98.4  F (36.9  C) (Oral)  Ht 5' 2.25\" (1.581 m)  Wt 127 lb 14.4 oz (58 kg)  SpO2 99%  BMI 23.21 kg/m2  EXAM:  GENERAL: healthy, alert and no distress  EYES: Eyes grossly normal to inspection, PERRL and conjunctivae and sclerae normal  HENT: ear canals and TM's normal, nose and mouth without ulcers or lesions  NECK: no adenopathy, no asymmetry, masses, or scars and thyroid normal to palpation  RESP: lungs clear to auscultation - no rales, rhonchi or wheezes  BREAST: normal without masses, tenderness or nipple discharge and no palpable axillary masses or adenopathy  CV: regular rate and rhythm, normal S1 S2, no S3 or S4, no murmur, click or rub, no peripheral edema and peripheral pulses strong  ABDOMEN: soft, nontender, no hepatosplenomegaly, no masses and bowel sounds normal  MS: no gross musculoskeletal defects noted, no edema  SKIN: no suspicious lesions or rashes  NEURO: Normal strength and tone, mentation intact and speech normal  PSYCH: mentation appears normal, affect " "normal/bright    ASSESSMENT/PLAN:   1. Routine general medical examination at a health care facility  History of LSIL in 2014 with negative HPV, successive Paps with HPV were normal/negative. Per ASCCP guidelines, will start screening with co-testing every 3 years - updated this in health maintenance. negative pregnancy test. Will check serum pregnancy test as well. Follow-up as needed.  - CBC with platelets  - Lipid panel reflex to direct LDL Fasting  - Hemoglobin A1c  - Beta HCG qual IFA urine - [facility] and [place]  - HCG qualitative    2. Need for prophylactic vaccination and inoculation against influenza  - FLU VAC, SPLIT VIRUS IM > 3 YO (QUADRIVALENT) [77306]  - Vaccine Administration, Initial [94839]      COUNSELING:   Reviewed preventive health counseling, as reflected in patient instructions       Regular exercise       Healthy diet/nutrition       Immunizations    Vaccinated for: Influenza         reports that she has never smoked. She has never used smokeless tobacco.    Estimated body mass index is 22.39 kg/(m^2) as calculated from the following:    Height as of 7/6/17: 5' 2.25\" (1.581 m).    Weight as of 8/17/17: 123 lb 6.4 oz (56 kg).         Counseling Resources:  ATP IV Guidelines  Pooled Cohorts Equation Calculator  Breast Cancer Risk Calculator  FRAX Risk Assessment  ICSI Preventive Guidelines  Dietary Guidelines for Americans, 2010  USDA's MyPlate  ASA Prophylaxis  Lung CA Screening    Augustus Grant,   [facility]  "

## 2017-12-05 NOTE — NURSING NOTE
"Chief Complaint   Patient presents with     Physical       Initial BP 99/64  Pulse 96  Temp 98.4  F (36.9  C) (Oral)  Ht 5' 2.25\" (1.581 m)  Wt 127 lb 14.4 oz (58 kg)  SpO2 99%  BMI 23.21 kg/m2 Estimated body mass index is 23.21 kg/(m^2) as calculated from the following:    Height as of this encounter: 5' 2.25\" (1.581 m).    Weight as of this encounter: 127 lb 14.4 oz (58 kg).  Medication Reconciliation: complete   Maisha Dunlap Certified Medical Assistant    "

## 2017-12-05 NOTE — MR AVS SNAPSHOT
After Visit Summary   12/5/2017    Alexsandra Westbrook    MRN: [id number]           Patient Information     Date Of Birth          1990        Visit Information        Provider Department      12/5/2017 11:00 AM Augustus Grant, ; PHONE,  [facility]        Today's Diagnoses     Routine general medical examination at a health care facility    -  1    Need for prophylactic vaccination and inoculation against influenza          Care Instructions      Preventive Health Recommendations  Female Ages 26 - 39  Yearly exam:   See your health care provider every year in order to    Review health changes.     Discuss preventive care.      Review your medicines if you your doctor has prescribed any.    Until age 30: Get a Pap test every three years (more often if you have had an abnormal result).    After age 30: Talk to your doctor about whether you should have a Pap test every 3 years or have a Pap test with HPV screening every 5 years.   You do not need a Pap test if your uterus was removed (hysterectomy) and you have not had cancer.  You should be tested each year for STDs (sexually transmitted diseases), if you're at risk.   Talk to your provider about how often to have your cholesterol checked.  If you are at risk for diabetes, you should have a diabetes test (fasting glucose).  Shots: Get a flu shot each year. Get a tetanus shot every 10 years.   Nutrition:     Eat at least 5 servings of fruits and vegetables each day.    Eat whole-grain bread, whole-wheat pasta and brown rice instead of white grains and rice.    Talk to your provider about Calcium and Vitamin D.     Lifestyle    Exercise at least 150 minutes a week (30 minutes a day, 5 days of the week). This will help you control your weight and prevent disease.    Limit alcohol to one drink per day.    No smoking.     Wear sunscreen to prevent skin cancer.    See your dentist every six months for an exam and cleaning.             "Follow-ups after your visit        Who to contact     If you have questions or need follow up information about today's clinic visit or your schedule please contact [facility] directly at [phone].  Normal or non-critical lab and imaging results will be communicated to you by MyChart, letter or phone within 4 business days after the clinic has received the results. If you do not hear from us within 7 days, please contact the clinic through MyChart or phone. If you have a critical or abnormal lab result, we will notify you by phone as soon as possible.  Submit refill requests through [employer] or call your pharmacy and they will forward the refill request to us. Please allow 3 business days for your refill to be completed.          Additional Information About Your Visit        [employer] Information     [employer] gives you secure access to your electronic health record. If you see a primary care provider, you can also send messages to your care team and make appointments. If you have questions, please call your primary care clinic.  If you do not have a primary care provider, please call [phone] and they will assist you.        Care EveryWhere ID     This is your Care EveryWhere ID. This could be used by other organizations to access your [place] medical records  [id number]        Your Vitals Were     Pulse Temperature Height Pulse Oximetry BMI (Body Mass Index)       96 98.4  F (36.9  C) (Oral) 5' 2.25\" (1.581 m) 99% 23.21 kg/m2        Blood Pressure from Last 3 Encounters:   12/05/17 99/64   08/17/17 110/60   08/12/17 121/79    Weight from Last 3 Encounters:   12/05/17 127 lb 14.4 oz (58 kg)   08/17/17 123 lb 6.4 oz (56 kg)   07/06/17 124 lb (56.2 kg)              We Performed the Following     Beta HCG qual IFA urine - [facility] and [place]     CBC with platelets     FLU VAC, SPLIT VIRUS IM > 3 YO (QUADRIVALENT) [24110]     HCG qualitative     Hemoglobin A1c     Lipid panel reflex to direct LDL " Fasting     Vaccine Administration, Initial [63450]        Primary Care Provider Office Phone # Fax #    Josh Sanchez [phone] [address] [place] [address]        Equal Access to Services     [facility] NICOLA : Hadii segun abdul dustyo Socarlos albertoali, waaxda luqadaha, qaybta kaalmada adericharyada, rekha sandoval laAldayasmin marsh. So [facility] [phone].    ATENCIÓN: Si habla español, tiene a barnes disposición servicios gratuitos de asistencia lingüística. Llame al [phone].    We comply with applicable federal civil rights laws and [place] laws. We do not discriminate on the basis of race, color, national origin, age, disability, sex, sexual orientation, or gender identity.            Thank you!     Thank you for [facility]  for your care. Our goal is always to provide you with excellent care. Hearing back from our patients is one way we can continue to improve our services. Please take a few minutes to complete the written survey that you may receive in the mail after your visit with us. Thank you!             Your Updated Medication List - Protect others around you: Learn how to safely use, store and throw away your medicines at www.disposemymeds.org.          This list is accurate as of: 12/5/17 11:59 PM.  Always use your most recent med list.                   Brand Name Dispense Instructions for use Diagnosis    IRON PO           OMEGA-3 FISH OIL PO           prenatal multivitamin plus iron 27-0.8 MG Tabs per tablet

## 2017-12-05 NOTE — PROGRESS NOTES

## 2018-01-17 ENCOUNTER — ALLIED HEALTH/NURSE VISIT (OUTPATIENT)
Dept: NURSING | Facility: CLINIC | Age: 28
End: 2018-01-17
Payer: COMMERCIAL

## 2018-01-17 DIAGNOSIS — Z78.9 PATIENT TRAVELS: Primary | ICD-10-CM

## 2018-01-17 PROCEDURE — 90471 IMMUNIZATION ADMIN: CPT

## 2018-01-17 PROCEDURE — 90691 TYPHOID VACCINE IM: CPT

## 2018-03-13 ENCOUNTER — OFFICE VISIT (OUTPATIENT)
Dept: OBGYN | Facility: CLINIC | Age: 28
End: 2018-03-13
Payer: COMMERCIAL

## 2018-03-13 VITALS
DIASTOLIC BLOOD PRESSURE: 74 MMHG | WEIGHT: 131.1 LBS | HEART RATE: 81 BPM | HEIGHT: 62 IN | TEMPERATURE: 98.1 F | SYSTOLIC BLOOD PRESSURE: 109 MMHG | BODY MASS INDEX: 24.13 KG/M2

## 2018-03-13 DIAGNOSIS — E28.2 PCOS (POLYCYSTIC OVARIAN SYNDROME): Primary | ICD-10-CM

## 2018-03-13 PROCEDURE — 99213 OFFICE O/P EST LOW 20 MIN: CPT | Performed by: OBSTETRICS & GYNECOLOGY

## 2018-03-13 RX ORDER — LETROZOLE 2.5 MG/1
2.5 TABLET, FILM COATED ORAL DAILY
Qty: 5 TABLET | Refills: 0 | Status: SHIPPED | OUTPATIENT
Start: 2018-03-13 | End: 2018-12-03

## 2018-03-13 RX ORDER — MEDROXYPROGESTERONE ACETATE 10 MG
10 TABLET ORAL DAILY
Qty: 10 TABLET | Refills: 0 | Status: SHIPPED | OUTPATIENT
Start: 2018-03-13 | End: 2019-03-16

## 2018-03-13 NOTE — MR AVS SNAPSHOT
"              After Visit Summary   3/13/2018    Alexsandra Westbrook    MRN: [id number]           Patient Information     Date Of Birth          1990        Visit Information        Provider Department      3/13/2018 2:00 PM Guerita Staples MD [facility]        Care Instructions    Instructions for Letrozole    1. Have a period  - Take a pregnancy test, and report results to Dr. Staples. She will then order letrozole.  - Provera for 10 days (if you are not having periods on your own)  - You will get a period after the provera  - Your first of bleeding (real bleeding, not just spotting) is \"Day One\"    2. Ovulate  - Take letrozole on days 3-7 (day 1 is first day of bleeding)    3. Check for ovulation  - Start day 10 for 10-14 days. Ovulation predictor kits helps us know when in cycle you ovulate. Inform Dr. Staples of when you had positive OPK.  - Come in to the lab for a blood draw on day 21 (progesterone), or when indicated by Dr. Staples based on OPK result.. If elevated, this confirms ovulation.    4. [place]  - Start day 11  - EVERY OTHER DAY to ensure a high concentration of sperm. DAILY for 2-3 days when ovulation kit is positive.    5. Check for pregnancy  - If you ovulated, you'll either get a period, or you'll be pregnant.  - If you don't get a period, take a pregnancy test two weeks after your progesterone level (day 35)    Send me a [employer] message or call with any questions.                Follow-ups after your visit        Who to contact     If you have questions or need follow up information about today's clinic visit or your schedule please contact [facility] directly at [phone].  Normal or non-critical lab and imaging results will be communicated to you by MyChart, letter or phone within 4 business days after the clinic has received the results. If you do not hear from us within 7 days, please contact the clinic through [employer] or phone. If you have a critical or " "abnormal lab result, we will notify you by phone as soon as possible.  Submit refill requests through [employer] or call your pharmacy and they will forward the refill request to us. Please allow 3 business days for your refill to be completed.          Additional Information About Your Visit        [employer] Information     [employer] gives you secure access to your electronic health record. If you see a primary care provider, you can also send messages to your care team and make appointments. If you have questions, please call your primary care clinic.  If you do not have a primary care provider, please call [phone] and they will assist you.        Care EveryWhere ID     This is your Care EveryWhere ID. This could be used by other organizations to access your [place] medical records  [id number]        Your Vitals Were     Pulse Temperature Height Breastfeeding? BMI (Body Mass Index)       81 98.1  F (36.7  C) (Oral) 5' 2.25\" (1.581 m) No 23.79 kg/m2        Blood Pressure from Last 3 Encounters:   03/13/18 109/74   12/05/17 99/64   08/17/17 110/60    Weight from Last 3 Encounters:   03/13/18 131 lb 1.6 oz (59.5 kg)   12/05/17 127 lb 14.4 oz (58 kg)   08/17/17 123 lb 6.4 oz (56 kg)              Today, you had the following     No orders found for display       Primary Care Provider Office Phone # Fax #    Josh Sanchez [phone] [address] [place] [address]        Equal Access to Services     [facility]: Hadii aad ku hadasho Soomaali, waaxda luqadaha, qaybta kaalmada adeegyada, rekha patel . So [facility] [phone].    ATENCIÓN: Si habla español, tiene a barnes disposición servicios gratuitos de asistencia lingüística. Llame al [phone].    We comply with applicable federal civil rights laws and [place] laws. We do not discriminate on the basis of race, color, national origin, age, disability, sex, sexual orientation, or gender identity.       "      Thank you!     Thank you for choosing [facility]  for your care. Our goal is always to provide you with excellent care. Hearing back from our patients is one way we can continue to improve our services. Please take a few minutes to complete the written survey that you may receive in the mail after your visit with us. Thank you!             Your Updated Medication List - Protect others around you: Learn how to safely use, store and throw away your medicines at www.disposemymeds.org.          This list is accurate as of 3/13/18  2:15 PM.  Always use your most recent med list.                   Brand Name Dispense Instructions for use Diagnosis    IRON PO           OMEGA-3 FISH OIL PO           prenatal multivitamin plus iron 27-0.8 MG Tabs per tablet

## 2018-03-13 NOTE — PATIENT INSTRUCTIONS
"Instructions for Letrozole    1. Have a period  - Take a pregnancy test, and report results to Dr. Staples. She will then order letrozole.  - Provera for 10 days (if you are not having periods on your own)  - You will get a period after the provera  - Your first of bleeding (real bleeding, not just spotting) is \"Day One\"    2. Ovulate  - Take letrozole on days 3-7 (day 1 is first day of bleeding)    3. Check for ovulation  - Start day 10 for 10-14 days. Ovulation predictor kits helps us know when in cycle you ovulate. Inform Dr. Staples of when you had positive OPK.  - Come in to the lab for a blood draw on day 21 (progesterone), or when indicated by Dr. Staples based on OPK result.. If elevated, this confirms ovulation.    4. [place]  - Start day 11  - EVERY OTHER DAY to ensure a high concentration of sperm. DAILY for 2-3 days when ovulation kit is positive.    5. Check for pregnancy  - If you ovulated, you'll either get a period, or you'll be pregnant.  - If you don't get a period, take a pregnancy test two weeks after your progesterone level (day 35)    Send me a [employer] message or call with any questions.        "

## 2018-03-13 NOTE — PROGRESS NOTES
"S:  Alexsandra and Giovany present for discussion of irregular menses  History of PCOS, diagnosed here. Conceived with letrozole.   Two prior miscarriages.   Uncomplicated term pregnancy, but baby was small (2200 grams) at 38 weeks.  Periods very irregular, every few months.   Conceived in August, but had miscarriage.   Has left pelvic pain intermittently.    O:  Vitals:    03/13/18 1357   BP: 109/74   Pulse: 81   Temp: 98.1  F (36.7  C)   TempSrc: Oral   Weight: 131 lb 1.6 oz (59.5 kg)   Height: 5' 2.25\" (1.581 m)     Gen: well appearing    A/P:  - Discussed ovulation induction with clomid versus letrozole  - Discussed lower risk of multiples based on preliminary data with letrozole  - Discussed off label use of letrozole, versus FDA approval for clomid. Discussed some patients with PCOS have improved fertility rates with letrozole, others with clomid. Discussed anti-estrogenic effect of clomid. Given thin endometrial stripe on prior ultrasound, letrozole may be better option  - Discussed side effects of letrozole, primarily headache, dizziness, fatigue.   - Reviewed written letrozole instructions, will communicate by [employer].  -  Giovany-  semen analysis previously done. HSG previously done and normal.  - Will start ovulation induction cycle with spontaneous menses or provera.  - Taking PNV.      Greater than 50% of this 20 minute appointment was spent in counseling on irregular menses, PCOS.    "

## 2018-03-13 NOTE — NURSING NOTE
"Chief Complaint   Patient presents with     Fertility     trying to get pregnant x 1 year.        Initial /74  Pulse 81  Temp 98.1  F (36.7  C) (Oral)  Ht 5' 2.25\" (1.581 m)  Wt 131 lb 1.6 oz (59.5 kg)  Breastfeeding? No  BMI 23.79 kg/m2 Estimated body mass index is 23.79 kg/(m^2) as calculated from the following:    Height as of this encounter: 5' 2.25\" (1.581 m).    Weight as of this encounter: 131 lb 1.6 oz (59.5 kg).  BP completed using cuff size: regular        The following HM Due: NONE      The following patient reported/Care Every where data was sent to:  P ABSTRACT QUALITY INITIATIVES [88839]        patient has appointment for today    Magy Landry CMA                "

## 2018-03-16 ENCOUNTER — OFFICE VISIT (OUTPATIENT)
Dept: URGENT CARE | Facility: URGENT CARE | Age: 28
End: 2018-03-16
Payer: COMMERCIAL

## 2018-03-16 VITALS
WEIGHT: 125 LBS | HEIGHT: 62 IN | BODY MASS INDEX: 23 KG/M2 | HEART RATE: 108 BPM | SYSTOLIC BLOOD PRESSURE: 100 MMHG | TEMPERATURE: 99.4 F | OXYGEN SATURATION: 98 % | DIASTOLIC BLOOD PRESSURE: 60 MMHG

## 2018-03-16 DIAGNOSIS — R82.90 NONSPECIFIC FINDING ON EXAMINATION OF URINE: ICD-10-CM

## 2018-03-16 DIAGNOSIS — R30.0 DYSURIA: ICD-10-CM

## 2018-03-16 DIAGNOSIS — N30.01 ACUTE CYSTITIS WITH HEMATURIA: Primary | ICD-10-CM

## 2018-03-16 LAB
ALBUMIN UR-MCNC: NEGATIVE MG/DL
APPEARANCE UR: ABNORMAL
BACTERIA #/AREA URNS HPF: ABNORMAL /HPF
BETA HCG QUAL IFA URINE: NEGATIVE
BILIRUB UR QL STRIP: NEGATIVE
COLOR UR AUTO: YELLOW
GLUCOSE UR STRIP-MCNC: NEGATIVE MG/DL
HGB UR QL STRIP: ABNORMAL
KETONES UR STRIP-MCNC: NEGATIVE MG/DL
LEUKOCYTE ESTERASE UR QL STRIP: ABNORMAL
NITRATE UR QL: NEGATIVE
NON-SQ EPI CELLS #/AREA URNS LPF: ABNORMAL /LPF
PH UR STRIP: 6 PH (ref 5–7)
RBC #/AREA URNS AUTO: ABNORMAL /HPF
SOURCE: ABNORMAL
SP GR UR STRIP: 1.01 (ref 1–1.03)
UROBILINOGEN UR STRIP-ACNC: 0.2 EU/DL (ref 0.2–1)
WBC #/AREA URNS AUTO: ABNORMAL /HPF

## 2018-03-16 PROCEDURE — 87086 URINE CULTURE/COLONY COUNT: CPT | Performed by: PHYSICIAN ASSISTANT

## 2018-03-16 PROCEDURE — 81001 URINALYSIS AUTO W/SCOPE: CPT | Performed by: PHYSICIAN ASSISTANT

## 2018-03-16 PROCEDURE — 99213 OFFICE O/P EST LOW 20 MIN: CPT | Performed by: PHYSICIAN ASSISTANT

## 2018-03-16 PROCEDURE — 84703 CHORIONIC GONADOTROPIN ASSAY: CPT | Performed by: PHYSICIAN ASSISTANT

## 2018-03-16 RX ORDER — PHENAZOPYRIDINE HYDROCHLORIDE 200 MG/1
200 TABLET, FILM COATED ORAL 3 TIMES DAILY PRN
Qty: 6 TABLET | Refills: 0 | Status: SHIPPED | OUTPATIENT
Start: 2018-03-16 | End: 2018-03-18

## 2018-03-16 RX ORDER — NITROFURANTOIN 25; 75 MG/1; MG/1
100 CAPSULE ORAL 2 TIMES DAILY
Qty: 14 CAPSULE | Refills: 0 | Status: SHIPPED | OUTPATIENT
Start: 2018-03-16 | End: 2019-09-25

## 2018-03-16 NOTE — PROGRESS NOTES
"SUBJECTIVE:   Alexsandra Westbrook is a 27 year old female who  presents today for a possible UTI. Symptoms of dysuria, urgency, frequency, burning, back pain and blood in urine have been going on for 1day(s).  Hematuria yes.  sudden onsetand moderate.  There is no history of fever, chills, nausea or vomiting.  No history of vaginal or penile discharge. This patient does have a history of urinary tract infections. Patient denies long duration, rigors, flank pain, temperature > 101 degrees F., Vomiting, significant nausea or diarrhea, taking Coumadin and GFR less than 30 within the last year or vaginal discharge, vaginal odor and vaginal itching. Patient took home pregnancy test on Tuesday, was negative. Has irregular periods from PCOS.      Past Medical History:   Diagnosis Date     LSIL (low grade squamous intraepithelial lesion) on Pap smear 7/2014    age 23     PCOS (polycystic ovarian syndrome)      Vitamin D deficiency      Current Outpatient Prescriptions   Medication Sig Dispense Refill     letrozole (FEMARA) 2.5 MG tablet Take 1 tablet (2.5 mg) by mouth daily (Patient not taking: Reported on 3/16/2018) 5 tablet 0     medroxyPROGESTERone (PROVERA) 10 MG tablet Take 1 tablet (10 mg) by mouth daily (Patient not taking: Reported on 3/16/2018) 10 tablet 0     IRON PO        Omega-3 Fatty Acids (OMEGA-3 FISH OIL PO)        Prenatal Vit-Fe Fumarate-FA (PRENATAL MULTIVITAMIN  PLUS IRON) 27-0.8 MG TABS        Social History   Substance Use Topics     Smoking status: Never Smoker     Smokeless tobacco: Never Used     Alcohol use No       ROS:   Review of systems negative except as stated above.    OBJECTIVE:  /60 (BP Location: Right arm, Cuff Size: Adult Regular)  Pulse 108  Temp 99.4  F (37.4  C) (Oral)  Ht 5' 2.25\" (1.581 m)  Wt 125 lb (56.7 kg)  SpO2 98%  BMI 22.68 kg/m2  GENERAL APPEARANCE: healthy, alert and no distress  RESP: lungs clear to auscultation - no rales, rhonchi or wheezes  CV: regular rates and " rhythm, normal S1 S2, no murmur noted  ABDOMEN:  soft, nontender, no HSM or masses and bowel sounds normal  BACK: No CVA tenderness  SKIN: no suspicious lesions or rashes    Results for orders placed or performed in visit on 03/16/18   *UA reflex to Microscopic and Culture ([facility] (except [place] and [place])   Result Value Ref Range    Color Urine Yellow     Appearance Urine Slightly Cloudy     Glucose Urine Negative NEG^Negative mg/dL    Bilirubin Urine Negative NEG^Negative    Ketones Urine Negative NEG^Negative mg/dL    Specific Gravity Urine 1.010 1.003 - 1.035    Blood Urine Moderate (A) NEG^Negative    pH Urine 6.0 5.0 - 7.0 pH    Protein Albumin Urine Negative NEG^Negative mg/dL    Urobilinogen Urine 0.2 0.2 - 1.0 EU/dL    Nitrite Urine Negative NEG^Negative    Leukocyte Esterase Urine Moderate (A) NEG^Negative    Source Midstream Urine    Urine Microscopic   Result Value Ref Range    WBC Urine 25-50 (A) OTO5^0 - 5 /HPF    RBC Urine 10-25 (A) OTO2^O - 2 /HPF    Squamous Epithelial /LPF Urine Few FEW^Few /LPF    Bacteria Urine Moderate (A) NEG^Negative /HPF   Beta HCG qual IFA urine   Result Value Ref Range    Beta HCG Qual IFA Urine Negative NEG^Negative          ASSESSMENT/PLAN:  1. Acute cystitis with hematuria  Drink plenty of fluids.  Prevention and treatment of UTI's discussed.Signs and symptoms of pyelonephritis mentioned.  Follow up with primary care physician if not improving  - Beta HCG qual IFA urine  - phenazopyridine (PYRIDIUM) 200 MG tablet; Take 1 tablet (200 mg) by mouth 3 times daily as needed for irritation  Dispense: 6 tablet; Refill: 0  - nitroFURantoin, macrocrystal-monohydrate, (MACROBID) 100 MG capsule; Take 1 capsule (100 mg) by mouth 2 times daily  Dispense: 14 capsule; Refill: 0    2. Dysuria  - *UA reflex to Microscopic and Culture ([facility] (except [place] and [place])  - Urine Microscopic  - phenazopyridine (PYRIDIUM) 200 MG tablet;  Take 1 tablet (200 mg) by mouth 3 times daily as needed for irritation  Dispense: 6 tablet; Refill: 0    3. Nonspecific finding on examination of urine  - Urine Culture Aerobic Bacterial      Concepcion Cervantes PA-C

## 2018-03-16 NOTE — MR AVS SNAPSHOT
"              After Visit Summary   3/16/2018    Alexsandra Westbrook    MRN: [id number]           Patient Information     Date Of Birth          1990        Visit Information        Provider Department      3/16/2018 10:00 AM Concepcion Cervantes PA-C [facility]        Today's Diagnoses     Acute cystitis with hematuria    -  1    Dysuria        Nonspecific finding on examination of urine           Follow-ups after your visit        Who to contact     If you have questions or need follow up information about today's clinic visit or your schedule please contact [facility] directly at [phone].  Normal or non-critical lab and imaging results will be communicated to you by DuneNetworkshart, letter or phone within 4 business days after the clinic has received the results. If you do not hear from us within 7 days, please contact the clinic through [employer] or phone. If you have a critical or abnormal lab result, we will notify you by phone as soon as possible.  Submit refill requests through [employer] or call your pharmacy and they will forward the refill request to us. Please allow 3 business days for your refill to be completed.          Additional Information About Your Visit        MyChart Information     [employer] gives you secure access to your electronic health record. If you see a primary care provider, you can also send messages to your care team and make appointments. If you have questions, please call your primary care clinic.  If you do not have a primary care provider, please call [phone] and they will assist you.        Care EveryWhere ID     This is your Care EveryWhere ID. This could be used by other organizations to access your [facility] medical records  [id number]        Your Vitals Were     Pulse Temperature Height Pulse Oximetry Breastfeeding? BMI (Body Mass Index)    108 99.4  F (37.4  C) (Oral) 5' 2.25\" (1.581 m) 98% No 22.68 kg/m2       Blood Pressure from Last 3 Encounters: "   03/16/18 100/60   03/13/18 109/74   12/05/17 99/64    Weight from Last 3 Encounters:   03/16/18 125 lb (56.7 kg)   03/13/18 131 lb 1.6 oz (59.5 kg)   12/05/17 127 lb 14.4 oz (58 kg)              We Performed the Following     *UA reflex to Microscopic and Culture ([place] [facility] (except [place] and [place])     Beta HCG qual IFA urine     Urine Culture Aerobic Bacterial     Urine Microscopic          Today's Medication Changes          These changes are accurate as of 3/16/18 10:47 AM.  If you have any questions, ask your nurse or doctor.               Start taking these medicines.        Dose/Directions    nitroFURantoin (macrocrystal-monohydrate) 100 MG capsule   Commonly known as:  MACROBID   Used for:  Acute cystitis with hematuria   Started by:  Concepcion Cervantes PA-C        Dose:  100 mg   Take 1 capsule (100 mg) by mouth 2 times daily   Quantity:  14 capsule   Refills:  0       phenazopyridine 200 MG tablet   Commonly known as:  PYRIDIUM   Used for:  Dysuria, Acute cystitis with hematuria   Started by:  Concepcion Cervantes PA-C        Dose:  200 mg   Take 1 tablet (200 mg) by mouth 3 times daily as needed for irritation   Quantity:  6 tablet   Refills:  0            Where to get your medicines      These medications were sent to [id number] - [place], [place] - [address] AT [facility] [address], [place] [address]     Phone:  [phone]     nitroFURantoin (macrocrystal-monohydrate) 100 MG capsule    phenazopyridine 200 MG tablet                Primary Care Provider Office Phone # Fax #    Josh Sanchez [phone] [address] [place] [address]        Equal Access to Services     [facility]: Kelly Knutson, wabarbarada luvanessa, qaybta kaallefty clinton, rekha marsh. So [facility] [phone].    ATENCIÓN: Si habla español, tiene a barnes disposición servicios gratuitos de  asistencia lingüística. Demarco al [phone].    We comply with applicable federal civil rights laws and [place] laws. We do not discriminate on the basis of race, color, national origin, age, disability, sex, sexual orientation, or gender identity.            Thank you!     Thank you for choosing [facility]  for your care. Our goal is always to provide you with excellent care. Hearing back from our patients is one way we can continue to improve our services. Please take a few minutes to complete the written survey that you may receive in the mail after your visit with us. Thank you!             Your Updated Medication List - Protect others around you: Learn how to safely use, store and throw away your medicines at www.disposemymeds.org.          This list is accurate as of 3/16/18 10:47 AM.  Always use your most recent med list.                   Brand Name Dispense Instructions for use Diagnosis    IRON PO           letrozole 2.5 MG tablet    FEMARA    5 tablet    Take 1 tablet (2.5 mg) by mouth daily    PCOS (polycystic ovarian syndrome)       medroxyPROGESTERone 10 MG tablet    PROVERA    10 tablet    Take 1 tablet (10 mg) by mouth daily    PCOS (polycystic ovarian syndrome)       nitroFURantoin (macrocrystal-monohydrate) 100 MG capsule    MACROBID    14 capsule    Take 1 capsule (100 mg) by mouth 2 times daily    Acute cystitis with hematuria       OMEGA-3 FISH OIL PO           phenazopyridine 200 MG tablet    PYRIDIUM    6 tablet    Take 1 tablet (200 mg) by mouth 3 times daily as needed for irritation    Dysuria, Acute cystitis with hematuria       prenatal multivitamin plus iron 27-0.8 MG Tabs per tablet

## 2018-03-17 LAB
BACTERIA SPEC CULT: NORMAL
SPECIMEN SOURCE: NORMAL

## 2018-10-30 ENCOUNTER — OFFICE VISIT (OUTPATIENT)
Dept: OBGYN | Facility: CLINIC | Age: 28
End: 2018-10-30
Payer: COMMERCIAL

## 2018-10-30 VITALS
TEMPERATURE: 98.3 F | BODY MASS INDEX: 24.86 KG/M2 | WEIGHT: 137 LBS | HEART RATE: 89 BPM | DIASTOLIC BLOOD PRESSURE: 73 MMHG | SYSTOLIC BLOOD PRESSURE: 111 MMHG

## 2018-10-30 DIAGNOSIS — N92.6 IRREGULAR PERIODS: Primary | ICD-10-CM

## 2018-10-30 DIAGNOSIS — Z31.9 PATIENT DESIRES PREGNANCY: ICD-10-CM

## 2018-10-30 LAB — BETA HCG QUAL IFA URINE: NEGATIVE

## 2018-10-30 PROCEDURE — 84703 CHORIONIC GONADOTROPIN ASSAY: CPT | Performed by: OBSTETRICS & GYNECOLOGY

## 2018-10-30 PROCEDURE — 99214 OFFICE O/P EST MOD 30 MIN: CPT | Performed by: OBSTETRICS & GYNECOLOGY

## 2018-10-30 RX ORDER — LETROZOLE 2.5 MG/1
TABLET, FILM COATED ORAL
Qty: 10 TABLET | Refills: 0 | Status: SHIPPED | OUTPATIENT
Start: 2018-10-30 | End: 2019-03-16

## 2018-10-30 RX ORDER — PRENATAL VIT/IRON FUM/FOLIC AC 27MG-0.8MG
1 TABLET ORAL DAILY
Qty: 100 TABLET | Refills: 3 | Status: SHIPPED | OUTPATIENT
Start: 2018-10-30 | End: 2019-09-25

## 2018-10-30 NOTE — NURSING NOTE
"Chief Complaint   Patient presents with     Fertility       Initial /73  Pulse 89  Temp 98.3  F (36.8  C) (Oral)  Wt 137 lb (62.1 kg)  BMI 24.86 kg/m2 Estimated body mass index is 24.86 kg/(m^2) as calculated from the following:    Height as of 3/16/18: 5' 2.25\" (1.581 m).    Weight as of this encounter: 137 lb (62.1 kg).  BP completed using cuff size: regular    Questioned patient about current smoking habits.  Pt. has never smoked.          The following HM Due: NONE      The following patient reported/Care Every where data was sent to:  P ABSTRACT QUALITY INITIATIVES [[id number]]  na      n/a              "

## 2018-10-30 NOTE — PROGRESS NOTES
GYN CLINIC VISIT  10/30/2018  CC: fertility    HPI:  Alexsandra Westbrook is a 27 year old  who presents because she desires pregnancy.  Was diagnosed with PCOS in the past. Conceived with letrozole. Same partner, Joselyn, here with him today.     Took letrozole 2.5mg in 2018, used ovulation predictor kits, did not turn positive, did not get pregnant.  No period since March until about 3 days ago. Wants rx for more letrozole. LMP 10/27/18. Light flow.     Last pap 10/3/16 NIL  No h/o STIs.    Obstetric History       T1      L1     SAB2   TAB0   Ectopic0   Multiple0   Live Births1       # Outcome Date GA Lbr [place]/2nd Weight Sex Delivery Anes PTL Lv   3 SAB 2017     AB, COMPLETE      2 Term 16 38w1d 00:38 / 00:54 4 lb 14.1 oz (2.215 kg) F Vag-Spont Local N DAVID      Apgar1:  7                Apgar5: 9   1 SAB 04/07/15     SAB           Past Medical History:   Diagnosis Date     LSIL (low grade squamous intraepithelial lesion) on Pap smear 2014    age 23     PCOS (polycystic ovarian syndrome)      Vitamin D deficiency      Past Surgical History:   Procedure Laterality Date     DILATION AND CURETTAGE SUCTION N/A 2015    Procedure: DILATION AND CURETTAGE SUCTION;  Surgeon: Hafsa Myers MD;  Location: [facility] OR       Vitals:    10/30/18 1040   BP: 111/73   Pulse: 89   Temp: 98.3  F (36.8  C)   TempSrc: Oral   Weight: 137 lb (62.1 kg)     Alert, oriented, no distress      ASSESSMENT:  27 year old  with irregular menses due to PCOS who desires pregnancy.  Conceived with letrozole in the past.      1. Irregular periods  Plan for ovulation induction with letrozole. Discussed trying Letrozole for ovulation induction.  Not FDA approved, but limited studies showing pregnancy rate higher than clomid especially in patients with PCOS.   Aromatase inhibitors are generally well tolerated. The main side effects are hot flushes and gastrointestinal events (nausea and vomiting), headache, back pain, and  leg cramps. Reviewed risk of multi-fetal pregnancy. Discussed could proceed with this or go onto a reproductive endocrine specialist at this point.  Patient would like to try Letrozole. Plan to start with 5mg days 3-7 of cycle.  Told to take it at same time each night.  Start LH testing day 10, progesterone on day 21 to confirm ovulation. Discussed that it may take more than 1 cycle.  Questions answered.  Patient will call or mychart with side effects or questions.    - Beta HCG Qual, Urine - [facility] and [place] ([id number])  - letrozole (FEMARA) 2.5 MG tablet; Take 2 tablets by mouth days 3-7 of cycle  Dispense: 10 tablet; Refill: 0  - Progesterone; Future    2. Patient desires pregnancy  Recommend patient start taking prenatal vitamins, abstain from alcohol.     - letrozole (FEMARA) 2.5 MG tablet; Take 2 tablets by mouth days 3-7 of cycle  Dispense: 10 tablet; Refill: 0  - Prenatal Vit-Fe Fumarate-FA (PRENATAL MULTIVITAMIN PLUS IRON) 27-0.8 MG TABS per tablet; Take 1 tablet by mouth daily  Dispense: 100 tablet; Refill: 3    Greater than 50% of this 25 minute appointment was spent in counseling with the patient on issues described above in the history of present illness and in the plan, including evaluation and management of fertility, PCOS, ovulation induction with letrozole.    Leena Hung MD

## 2018-10-30 NOTE — MR AVS SNAPSHOT
After Visit Summary   10/30/2018    Alexsandra Westbrook    MRN: [id number]           Patient Information     Date Of Birth          1990        Visit Information        Provider Department      10/30/2018 10:30 AM Leena Hung MD [facility]        Today's Diagnoses     Irregular periods    -  1    Patient desires pregnancy          Care Instructions    Instructions for Letrozole    1. Have a period  - Take a pregnancy test    2. Ovulate  - Take letrozole on days 3-7 (start today)    3. [place]  - Start day 11  - EVERY OTHER DAY to ensure a high concentration of sperm    4. Check for ovulation  - Ovulation predictor kits helps us know when in cycle you ovulate, start on day 10  - Come in to the lab for a blood draw on day 21 (progesterone). If elevated, this confirms ovulation.  - Let me know when day 21 will be, so we can plan the lab draw if it falls on a weekend    5. Check for pregnancy  - If you ovulated, you'll either get a period, or you'll be pregnant.  - If you don't get a period, take a pregnancy test two weeks after your progesterone level (day 35)    Send me a [employer] message or call with any questions.   [facility]  Professional Building  [address] [place] [address]  [phone]             Follow-ups after your visit        Future tests that were ordered for you today     Open Future Orders        Priority Expected Expires Ordered    Progesterone Routine  10/30/2019 10/30/2018            Who to contact     If you have questions or need follow up information about today's clinic visit or your schedule please contact [facility] directly at [phone].  Normal or non-critical lab and imaging results will be communicated to you by MyChart, letter or phone within 4 business days after the clinic has received the results. If you do not hear from us within 7 days, please contact the clinic through  [employer] or phone. If you have a critical or abnormal lab result, we will notify you by phone as soon as possible.  Submit refill requests through [employer] or call your pharmacy and they will forward the refill request to us. Please allow 3 business days for your refill to be completed.          Additional Information About Your Visit        EducerusharDoblet Information     [employer] gives you secure access to your electronic health record. If you see a primary care provider, you can also send messages to your care team and make appointments. If you have questions, please call your primary care clinic.  If you do not have a primary care provider, please call [phone] and they will assist you.        Care EveryWhere ID     This is your Care EveryWhere ID. This could be used by other organizations to access your [facility] medical records  [id number]        Your Vitals Were     Pulse Temperature BMI (Body Mass Index)             89 98.3  F (36.8  C) (Oral) 24.86 kg/m2          Blood Pressure from Last 3 Encounters:   10/30/18 111/73   03/16/18 100/60   03/13/18 109/74    Weight from Last 3 Encounters:   10/30/18 137 lb (62.1 kg)   03/16/18 125 lb (56.7 kg)   03/13/18 131 lb 1.6 oz (59.5 kg)              We Performed the Following     Beta HCG Qual, Urine - [facility] and [place] ([id number])          Today's Medication Changes          These changes are accurate as of 10/30/18 10:52 AM.  If you have any questions, ask your nurse or doctor.               These medicines have changed or have updated prescriptions.        Dose/Directions    * letrozole 2.5 MG tablet   Commonly known as:  FEMARA   This may have changed:  Another medication with the same name was added. Make sure you understand how and when to take each.   Used for:  PCOS (polycystic ovarian syndrome)   Changed by:  Leena Hung MD        Dose:  2.5 mg   Take 1 tablet (2.5 mg) by mouth daily   Quantity:  5 tablet   Refills:  0       * letrozole 2.5 MG tablet    Commonly known as:  FEMARA   This may have changed:  You were already taking a medication with the same name, and this prescription was added. Make sure you understand how and when to take each.   Used for:  Irregular periods, Patient desires pregnancy   Changed by:  Leena Hung MD        Take 2 tablets by mouth days 3-7 of cycle   Quantity:  10 tablet   Refills:  0       * Notice:  This list has 2 medication(s) that are the same as other medications prescribed for you. Read the directions carefully, and ask your doctor or other care provider to review them with you.         Where to get your medicines      These medications were sent to [facility] #[id number] - [place], [place] - [address]  [address], [place] [address]     Phone:  [phone]     letrozole 2.5 MG tablet                Primary Care Provider Office Phone # Fax #    Josh Sanchez [phone] [address] [place] [address]        Equal Access to Services     [facility]: Hadii segun ku hadasho Soomaali, waaxda luqadaha, qaybta kaalmada adeegyada, rekha patel . So [facility] [phone].    ATENCIÓN: Si habla español, tiene a barnes disposición servicios gratuitos de asistencia lingüística. Llame al [phone].    We comply with applicable federal civil rights laws and [place] laws. We do not discriminate on the basis of race, color, national origin, age, disability, sex, sexual orientation, or gender identity.            Thank you!     Thank you for choosing [facility]  for your care. Our goal is always to provide you with excellent care. Hearing back from our patients is one way we can continue to improve our services. Please take a few minutes to complete the written survey that you may receive in the mail after your visit with us. Thank you!             Your Updated Medication List - Protect others around you: Learn how to safely use, store  and throw away your medicines at www.disposemymeds.org.          This list is accurate as of 10/30/18 10:52 AM.  Always use your most recent med list.                   Brand Name Dispense Instructions for use Diagnosis    IRON PO           * letrozole 2.5 MG tablet    FEMARA    5 tablet    Take 1 tablet (2.5 mg) by mouth daily    PCOS (polycystic ovarian syndrome)       * letrozole 2.5 MG tablet    FEMARA    10 tablet    Take 2 tablets by mouth days 3-7 of cycle    Irregular periods, Patient desires pregnancy       medroxyPROGESTERone 10 MG tablet    PROVERA    10 tablet    Take 1 tablet (10 mg) by mouth daily    PCOS (polycystic ovarian syndrome)       nitroFURantoin (macrocrystal-monohydrate) 100 MG capsule    MACROBID    14 capsule    Take 1 capsule (100 mg) by mouth 2 times daily    Acute cystitis with hematuria       OMEGA-3 FISH OIL PO           prenatal multivitamin plus iron 27-0.8 MG Tabs per tablet           * Notice:  This list has 2 medication(s) that are the same as other medications prescribed for you. Read the directions carefully, and ask your doctor or other care provider to review them with you.

## 2018-10-30 NOTE — PATIENT INSTRUCTIONS
Instructions for Letrozole    1. Have a period  - Take a pregnancy test    2. Ovulate  - Take letrozole on days 3-7 (start today)    3. [place]  - Start day 11  - EVERY OTHER DAY to ensure a high concentration of sperm    4. Check for ovulation  - Ovulation predictor kits helps us know when in cycle you ovulate, start on day 10  - Come in to the lab for a blood draw on day 21 (progesterone). If elevated, this confirms ovulation.  - Let me know when day 21 will be, so we can plan the lab draw if it falls on a weekend    5. Check for pregnancy  - If you ovulated, you'll either get a period, or you'll be pregnant.  - If you don't get a period, take a pregnancy test two weeks after your progesterone level (day 35)    Send me a [employer] message or call with any questions.   [facility]  Professional Building  [address]  [place] [address]  [phone]

## 2018-11-17 DIAGNOSIS — N92.6 IRREGULAR PERIODS: ICD-10-CM

## 2018-11-17 LAB — PROGEST SERPL-MCNC: 13.2 NG/ML

## 2018-11-17 PROCEDURE — 36415 COLL VENOUS BLD VENIPUNCTURE: CPT | Performed by: OBSTETRICS & GYNECOLOGY

## 2018-11-17 PROCEDURE — 84144 ASSAY OF PROGESTERONE: CPT | Performed by: OBSTETRICS & GYNECOLOGY

## 2018-11-30 ENCOUNTER — MYC MEDICAL ADVICE (OUTPATIENT)
Dept: OBGYN | Facility: CLINIC | Age: 28
End: 2018-11-30

## 2018-11-30 DIAGNOSIS — E28.2 PCOS (POLYCYSTIC OVARIAN SYNDROME): ICD-10-CM

## 2018-12-03 RX ORDER — LETROZOLE 2.5 MG/1
2.5 TABLET, FILM COATED ORAL DAILY
Qty: 5 TABLET | Refills: 0 | Status: SHIPPED | OUTPATIENT
Start: 2018-12-03 | End: 2019-09-25

## 2018-12-03 NOTE — TELEPHONE ENCOUNTER
Pt responded late Fri with pharmacy info. I believe today is day 4 of her cycle.   Klarissa Bhakta, RN-BSN

## 2018-12-03 NOTE — TELEPHONE ENCOUNTER
Patient's  calling to see if prescription was sent. Informed them SL seeing patients and will get to it when she has a chance.   Klarissa Bhakta RN-BSN

## 2019-03-15 ENCOUNTER — MYC MEDICAL ADVICE (OUTPATIENT)
Dept: OBGYN | Facility: CLINIC | Age: 29
End: 2019-03-15

## 2019-03-15 DIAGNOSIS — Z31.9 PATIENT DESIRES PREGNANCY: ICD-10-CM

## 2019-03-15 DIAGNOSIS — N92.6 IRREGULAR PERIODS: ICD-10-CM

## 2019-03-15 DIAGNOSIS — E28.2 PCOS (POLYCYSTIC OVARIAN SYNDROME): ICD-10-CM

## 2019-03-16 RX ORDER — MEDROXYPROGESTERONE ACETATE 10 MG
10 TABLET ORAL DAILY
Qty: 10 TABLET | Refills: 0 | Status: SHIPPED | OUTPATIENT
Start: 2019-03-16 | End: 2019-09-25

## 2019-03-16 RX ORDER — LETROZOLE 2.5 MG/1
TABLET, FILM COATED ORAL
Qty: 10 TABLET | Refills: 0 | Status: SHIPPED | OUTPATIENT
Start: 2019-03-16 | End: 2019-09-25

## 2019-09-25 ENCOUNTER — OFFICE VISIT (OUTPATIENT)
Dept: INTERNAL MEDICINE | Facility: CLINIC | Age: 29
End: 2019-09-25
Payer: COMMERCIAL

## 2019-09-25 VITALS
TEMPERATURE: 98.3 F | SYSTOLIC BLOOD PRESSURE: 104 MMHG | DIASTOLIC BLOOD PRESSURE: 70 MMHG | OXYGEN SATURATION: 98 % | HEIGHT: 62 IN | BODY MASS INDEX: 21.4 KG/M2 | WEIGHT: 116.3 LBS | HEART RATE: 81 BPM | RESPIRATION RATE: 20 BRPM

## 2019-09-25 DIAGNOSIS — Z00.00 ROUTINE GENERAL MEDICAL EXAMINATION AT A HEALTH CARE FACILITY: Primary | ICD-10-CM

## 2019-09-25 LAB
ERYTHROCYTE [DISTWIDTH] IN BLOOD BY AUTOMATED COUNT: 12.1 % (ref 10–15)
HCT VFR BLD AUTO: 42 % (ref 35–47)
HGB BLD-MCNC: 13.7 G/DL (ref 11.7–15.7)
MCH RBC QN AUTO: 29.3 PG (ref 26.5–33)
MCHC RBC AUTO-ENTMCNC: 32.6 G/DL (ref 31.5–36.5)
MCV RBC AUTO: 90 FL (ref 78–100)
PLATELET # BLD AUTO: 351 10E9/L (ref 150–450)
RBC # BLD AUTO: 4.67 10E12/L (ref 3.8–5.2)
WBC # BLD AUTO: 4.7 10E9/L (ref 4–11)

## 2019-09-25 PROCEDURE — 90682 RIV4 VACC RECOMBINANT DNA IM: CPT | Performed by: NURSE PRACTITIONER

## 2019-09-25 PROCEDURE — 84460 ALANINE AMINO (ALT) (SGPT): CPT | Performed by: NURSE PRACTITIONER

## 2019-09-25 PROCEDURE — 80048 BASIC METABOLIC PNL TOTAL CA: CPT | Performed by: NURSE PRACTITIONER

## 2019-09-25 PROCEDURE — G0145 SCR C/V CYTO,THINLAYER,RESCR: HCPCS | Performed by: NURSE PRACTITIONER

## 2019-09-25 PROCEDURE — 84443 ASSAY THYROID STIM HORMONE: CPT | Performed by: NURSE PRACTITIONER

## 2019-09-25 PROCEDURE — 85027 COMPLETE CBC AUTOMATED: CPT | Performed by: NURSE PRACTITIONER

## 2019-09-25 PROCEDURE — 80061 LIPID PANEL: CPT | Performed by: NURSE PRACTITIONER

## 2019-09-25 PROCEDURE — 90471 IMMUNIZATION ADMIN: CPT | Performed by: NURSE PRACTITIONER

## 2019-09-25 PROCEDURE — 99395 PREV VISIT EST AGE 18-39: CPT | Mod: 25 | Performed by: NURSE PRACTITIONER

## 2019-09-25 PROCEDURE — 36415 COLL VENOUS BLD VENIPUNCTURE: CPT | Performed by: NURSE PRACTITIONER

## 2019-09-25 ASSESSMENT — ENCOUNTER SYMPTOMS
FEVER: 0
COUGH: 0
HEARTBURN: 0
HEMATURIA: 0
HEMATOCHEZIA: 0
MYALGIAS: 0
JOINT SWELLING: 0
SORE THROAT: 0
HEADACHES: 0
ARTHRALGIAS: 0

## 2019-09-25 ASSESSMENT — MIFFLIN-ST. JEOR: SCORE: 1214.75

## 2019-09-25 NOTE — PROGRESS NOTES
SUBJECTIVE:   CC: Alexsandra Westbrook is an 28 year old woman who presents for preventive health visit.     Healthy Habits:     Getting at least 3 servings of Calcium per day:  Yes    Bi-annual eye exam:  Yes    Dental care twice a year:  NO    Sleep apnea or symptoms of sleep apnea:  None    Diet:  Regular (no restrictions)    Frequency of exercise:  2-3 days/week    Duration of exercise:  15-30 minutes    Taking medications regularly:  Yes    PHQ-2 Total Score: 1    Additional concerns today:  No              Today's PHQ-2 Score:   PHQ-2 ( 1999 Pfizer) 9/25/2019   Q1: Little interest or pleasure in doing things 1   Q2: Feeling down, depressed or hopeless 0   PHQ-2 Score 1   Q1: Little interest or pleasure in doing things Several days   Q2: Feeling down, depressed or hopeless Not at all   PHQ-2 Score 1       Abuse: Current or Past(Physical, Sexual or Emotional)- No  Do you feel safe in your environment? Yes    Social History     Tobacco Use     Smoking status: Never Smoker     Smokeless tobacco: Never Used   Substance Use Topics     Alcohol use: No     If you drink alcohol do you typically have >3 drinks per day or >7 drinks per week? No    Alcohol Use 9/25/2019   Prescreen: >3 drinks/day or >7 drinks/week? No   Prescreen: >3 drinks/day or >7 drinks/week? -   No flowsheet data found.    Reviewed orders with patient.  Reviewed health maintenance and updated orders accordingly - Yes  BP Readings from Last 3 Encounters:   09/25/19 104/70   10/30/18 111/73   03/16/18 100/60    Wt Readings from Last 3 Encounters:   09/25/19 52.8 kg (116 lb 4.8 oz)   10/30/18 62.1 kg (137 lb)   03/16/18 56.7 kg (125 lb)                  Patient Active Problem List   Diagnosis     CARDIOVASCULAR SCREENING; LDL GOAL LESS THAN 160     Insomnia     Vitamin D deficiency     PCOS (polycystic ovarian syndrome)     Helicobacter pylori gastritis     Thyromegaly     Irregular periods/menstrual cycles     Past Surgical History:   Procedure Laterality Date      DILATION AND CURETTAGE SUCTION N/A 4/7/2015    Procedure: DILATION AND CURETTAGE SUCTION;  Surgeon: Hafsa Myers MD;  Location: [facility] OR       Social History     Tobacco Use     Smoking status: Never Smoker     Smokeless tobacco: Never Used   Substance Use Topics     Alcohol use: No     Family History   Problem Relation Age of Onset     Family History Negative Mother      Thyroid Disease Mother         enlarged, hypothyroidism     Family History Negative Father      Diabetes Maternal Grandmother      Cerebrovascular Disease Maternal Grandfather         old age     Coronary Artery Disease No family hx of      Hypertension No family hx of      Hyperlipidemia No family hx of      Breast Cancer No family hx of      Colon Cancer No family hx of          No current outpatient medications on file.       Mammogram not appropriate for this patient based on age.    Pertinent mammograms are reviewed under the imaging tab.  History of abnormal Pap smear: NO - age 21-29 PAP every 3 years recommended  PAP / HPV Latest Ref Rng & Units 10/3/2016 7/13/2015 7/9/2014   PAP - NIL NIL LSIL(A)   HPV 16 DNA NEG Negative Negative -   HPV 18 DNA NEG Negative Negative -   OTHER HR HPV NEG Negative Negative -     Reviewed and updated as needed this visit by clinical staff  Tobacco  Allergies  Meds  Med Hx  Surg Hx  Fam Hx  Soc Hx        Reviewed and updated as needed this visit by Provider            Review of Systems   Constitutional: Negative for fever.   HENT: Negative for hearing loss and sore throat.    Respiratory: Negative for cough.    Cardiovascular: Negative for chest pain and peripheral edema.   Gastrointestinal: Negative for heartburn and hematochezia.   Breasts:  Negative for tenderness.   Genitourinary: Negative for genital sores, hematuria, pelvic pain, vaginal bleeding and vaginal discharge.   Musculoskeletal: Negative for arthralgias, joint swelling and myalgias.   Neurological: Negative for headaches.  "  Psychiatric/Behavioral: Negative for mood changes.          OBJECTIVE:   /70 (BP Location: Right arm, Patient Position: Sitting, Cuff Size: Adult Regular)   Pulse 81   Temp 98.3  F (36.8  C) (Oral)   Resp 20   Ht 1.581 m (5' 2.25\")   Wt 52.8 kg (116 lb 4.8 oz)   LMP 09/09/2019 (Exact Date)   SpO2 98%   BMI 21.10 kg/m    Physical Exam  GENERAL: healthy, alert and no distress  NECK: no adenopathy, no asymmetry, masses, or scars and thyromegaly approximately 2 times normal  RESP: lungs clear to auscultation - no rales, rhonchi or wheezes  CV: regular rate and rhythm, normal S1 S2, no S3 or S4, no murmur, click or rub, no peripheral edema and peripheral pulses strong  ABDOMEN: soft, nontender, no hepatosplenomegaly, no masses and bowel sounds normal   (female): normal female external genitalia, normal urethral meatus  and vaginal mucosa pink, moist, well rugated  MS: no gross musculoskeletal defects noted, no edema  PSYCH: mentation appears normal, affect normal/bright        ASSESSMENT/PLAN:       ICD-10-CM    1. Routine general medical examination at a health care facility Z00.00 INFLUENZA VACCINE IM > 6 MONTHS VALENT IIV4 [79859]     CBC with platelets     Basic metabolic panel     ALT     Lipid Profile     TSH with free T4 reflex     Pap imaged thin layer screen reflex to HPV if ASCUS - recommend age 25 - 29       COUNSELING:  Reviewed preventive health counseling, as reflected in patient instructions    Estimated body mass index is 21.1 kg/m  as calculated from the following:    Height as of this encounter: 1.581 m (5' 2.25\").    Weight as of this encounter: 52.8 kg (116 lb 4.8 oz).         reports that she has never smoked. She has never used smokeless tobacco.      Counseling Resources:  ATP IV Guidelines  Pooled Cohorts Equation Calculator  Breast Cancer Risk Calculator  FRAX Risk Assessment  ICSI Preventive Guidelines  Dietary Guidelines for Americans, 2010  USDA's MyPlate  ASA " Prophylaxis  Lung CA Screening    Monica Frances, STEVEN  [facility]

## 2019-09-26 ENCOUNTER — OFFICE VISIT (OUTPATIENT)
Dept: FAMILY MEDICINE | Facility: CLINIC | Age: 29
End: 2019-09-26
Payer: COMMERCIAL

## 2019-09-26 VITALS
HEART RATE: 64 BPM | SYSTOLIC BLOOD PRESSURE: 117 MMHG | DIASTOLIC BLOOD PRESSURE: 68 MMHG | WEIGHT: 115 LBS | OXYGEN SATURATION: 99 % | RESPIRATION RATE: 16 BRPM | BODY MASS INDEX: 21.16 KG/M2 | HEIGHT: 62 IN | TEMPERATURE: 98.2 F

## 2019-09-26 DIAGNOSIS — G43.109 MIGRAINE WITH AURA AND WITHOUT STATUS MIGRAINOSUS, NOT INTRACTABLE: Primary | ICD-10-CM

## 2019-09-26 DIAGNOSIS — E07.9 ASYMMETRICAL THYROID: ICD-10-CM

## 2019-09-26 LAB
ALT SERPL W P-5'-P-CCNC: 21 U/L (ref 0–50)
ANION GAP SERPL CALCULATED.3IONS-SCNC: 7 MMOL/L (ref 3–14)
BUN SERPL-MCNC: 14 MG/DL (ref 7–30)
CALCIUM SERPL-MCNC: 9.1 MG/DL (ref 8.5–10.1)
CHLORIDE SERPL-SCNC: 105 MMOL/L (ref 94–109)
CHOLEST SERPL-MCNC: 201 MG/DL
CO2 SERPL-SCNC: 25 MMOL/L (ref 20–32)
CREAT SERPL-MCNC: 0.63 MG/DL (ref 0.52–1.04)
GFR SERPL CREATININE-BSD FRML MDRD: >90 ML/MIN/{1.73_M2}
GLUCOSE SERPL-MCNC: 89 MG/DL (ref 70–99)
HDLC SERPL-MCNC: 45 MG/DL
LDLC SERPL CALC-MCNC: 137 MG/DL
NONHDLC SERPL-MCNC: 156 MG/DL
POTASSIUM SERPL-SCNC: 4.2 MMOL/L (ref 3.4–5.3)
SODIUM SERPL-SCNC: 137 MMOL/L (ref 133–144)
TRIGL SERPL-MCNC: 97 MG/DL
TSH SERPL DL<=0.005 MIU/L-ACNC: 0.65 MU/L (ref 0.4–4)

## 2019-09-26 PROCEDURE — 99214 OFFICE O/P EST MOD 30 MIN: CPT | Performed by: PHYSICIAN ASSISTANT

## 2019-09-26 RX ORDER — SUMATRIPTAN 50 MG/1
50 TABLET, FILM COATED ORAL
Qty: 9 TABLET | Refills: 2 | Status: SHIPPED | OUTPATIENT
Start: 2019-09-26 | End: 2020-02-24

## 2019-09-26 RX ORDER — IBUPROFEN 200 MG
200 TABLET ORAL EVERY 4 HOURS PRN
Qty: 90 TABLET | Refills: 0 | COMMUNITY
Start: 2019-09-26 | End: 2020-02-24

## 2019-09-26 ASSESSMENT — MIFFLIN-ST. JEOR: SCORE: 1208.86

## 2019-09-26 NOTE — PROGRESS NOTES
Subjective     Alexsandra Westbrook is a 28 year old female who presents to clinic today for the following health issues:    History of Present Illness        She eats 2-3 servings of fruits and vegetables daily.She consumes 1 sweetened beverage(s) daily.  She is taking medications regularly.     Headache  Onset: month    Description:   Location: half of right side    Character: heavy  Frequency:  1 day  Duration:  All day    Intensity: mild    Progression of Symptoms:  same    Accompanying Signs & Symptoms:  Stiff neck: YES  Neck or upper back pain: YES  Fever: no   Sinus pressure: no   Nausea or vomiting: no   Dizziness: no   Numbness: YES  Weakness: no   Visual changes: no     History:   Head trauma: no   Family history of migraines: YES--mother  Previous tests for headaches: no   Neurologist evaluations: no   Able to do daily activities: YES  Wake with a headaches: no   Do headaches wake you up: no   Daily pain medication use: no   Work/school stressors/changes: no     Precipitating factors:   Does light make it worse: no  Does sound make it worse: YES    Alleviating factors:  Does sleep help: YES    Therapies Tried and outcome: Ibuprofen (Advil, Motrin)  Thyroid concerns--Patient feels thyroid is enlarged.   Had normal TSH yesterday. Sometimes feels it's pushing in on throat.     Review of Systems   ROS COMP: Constitutional, HEENT, cardiovascular, pulmonary, gi and gu systems are negative, except as otherwise noted.      Objective    LMP 09/09/2019 (Exact Date)   Breastfeeding? No   There is no height or weight on file to calculate BMI.  Physical Exam   GENERAL APPEARANCE: healthy, alert and no distress  EYES: Eyes grossly normal to inspection, PERRL and conjunctivae and sclerae normal  HENT: ear canals and TM's normal and nose and mouth without ulcers or lesions  NECK: no adenopathy, no asymmetry, masses, or scars and symmetrical thyroid R>L  RESP: lungs clear to auscultation - no rales, rhonchi or wheezes  CV: regular  rates and rhythm, normal S1 S2, no S3 or S4 and no murmur, click or rub  DIABETIC FOOT EXAM: normal DP and PT pulses, no trophic changes or ulcerative lesions and normal sensory exam  PSYCH: mentation appears normal and affect normal/bright            Assessment & Plan     1. Migraine with aura and without status migrainosus, not intractable  Risks/benefits of medication use discussed with patient. Patient reports understanding and accepts trial of medication.  Recheck if symptoms persist or worsen.   - SUMAtriptan (IMITREX) 50 MG tablet; Take 1 tablet (50 mg) by mouth at onset of headache for migraine May repeat in 2 hours. Max 4 tablets/24 hours.  Dispense: 9 tablet; Refill: 2    2. Asymmetrical thyroid  Await US.   - US Thyroid; Future           No follow-ups on file.    Tonia Warren PA-C  [facility]

## 2019-09-30 LAB
COPATH REPORT: NORMAL
PAP: NORMAL

## 2019-10-08 ENCOUNTER — HOSPITAL ENCOUNTER (OUTPATIENT)
Dept: ULTRASOUND IMAGING | Facility: CLINIC | Age: 29
Discharge: HOME | End: 2019-10-08
Attending: PHYSICIAN ASSISTANT | Admitting: PHYSICIAN ASSISTANT
Payer: COMMERCIAL

## 2019-10-08 DIAGNOSIS — E07.9 ASYMMETRICAL THYROID: ICD-10-CM

## 2019-10-08 PROCEDURE — 76536 US EXAM OF HEAD AND NECK: CPT

## 2020-02-22 ENCOUNTER — APPOINTMENT (OUTPATIENT)
Dept: ULTRASOUND IMAGING | Facility: CLINIC | Age: 30
End: 2020-02-22
Attending: EMERGENCY MEDICINE
Payer: COMMERCIAL

## 2020-02-22 ENCOUNTER — HOSPITAL ENCOUNTER (EMERGENCY)
Facility: CLINIC | Age: 30
Discharge: HOME | End: 2020-02-22
Attending: EMERGENCY MEDICINE | Admitting: EMERGENCY MEDICINE
Payer: COMMERCIAL

## 2020-02-22 VITALS
DIASTOLIC BLOOD PRESSURE: 86 MMHG | WEIGHT: 130.95 LBS | OXYGEN SATURATION: 100 % | TEMPERATURE: 98.3 F | BODY MASS INDEX: 23.76 KG/M2 | SYSTOLIC BLOOD PRESSURE: 142 MMHG | RESPIRATION RATE: 16 BRPM

## 2020-02-22 DIAGNOSIS — N89.8 VAGINAL DISCHARGE: ICD-10-CM

## 2020-02-22 DIAGNOSIS — R10.32 ABDOMINAL PAIN, LEFT LOWER QUADRANT: ICD-10-CM

## 2020-02-22 DIAGNOSIS — Z33.1 PREGNANT STATE, INCIDENTAL: ICD-10-CM

## 2020-02-22 LAB
B-HCG SERPL-ACNC: ABNORMAL IU/L (ref 0–5)
BASOPHILS # BLD AUTO: 0 10E9/L (ref 0–0.2)
BASOPHILS NFR BLD AUTO: 0.4 %
DIFFERENTIAL METHOD BLD: NORMAL
EOSINOPHIL # BLD AUTO: 0.1 10E9/L (ref 0–0.7)
EOSINOPHIL NFR BLD AUTO: 1.8 %
ERYTHROCYTE [DISTWIDTH] IN BLOOD BY AUTOMATED COUNT: 12 % (ref 10–15)
HCT VFR BLD AUTO: 40.8 % (ref 35–47)
HGB BLD-MCNC: 13.2 G/DL (ref 11.7–15.7)
IMM GRANULOCYTES # BLD: 0 10E9/L (ref 0–0.4)
IMM GRANULOCYTES NFR BLD: 0.1 %
LYMPHOCYTES # BLD AUTO: 2.3 10E9/L (ref 0.8–5.3)
LYMPHOCYTES NFR BLD AUTO: 32 %
MCH RBC QN AUTO: 29.4 PG (ref 26.5–33)
MCHC RBC AUTO-ENTMCNC: 32.4 G/DL (ref 31.5–36.5)
MCV RBC AUTO: 91 FL (ref 78–100)
MONOCYTES # BLD AUTO: 0.7 10E9/L (ref 0–1.3)
MONOCYTES NFR BLD AUTO: 9.8 %
NEUTROPHILS # BLD AUTO: 4 10E9/L (ref 1.6–8.3)
NEUTROPHILS NFR BLD AUTO: 55.9 %
NRBC # BLD AUTO: 0 10*3/UL
NRBC BLD AUTO-RTO: 0 /100
PLATELET # BLD AUTO: 367 10E9/L (ref 150–450)
RBC # BLD AUTO: 4.49 10E12/L (ref 3.8–5.2)
SPECIMEN SOURCE: NORMAL
WBC # BLD AUTO: 7.1 10E9/L (ref 4–11)
WET PREP SPEC: NORMAL

## 2020-02-22 PROCEDURE — 87210 SMEAR WET MOUNT SALINE/INK: CPT | Performed by: EMERGENCY MEDICINE

## 2020-02-22 PROCEDURE — 85025 COMPLETE CBC W/AUTO DIFF WBC: CPT | Performed by: EMERGENCY MEDICINE

## 2020-02-22 PROCEDURE — 25800030 ZZH RX IP 258 OP 636: Performed by: EMERGENCY MEDICINE

## 2020-02-22 PROCEDURE — 99284 EMERGENCY DEPT VISIT MOD MDM: CPT | Mod: 25

## 2020-02-22 PROCEDURE — 87491 CHLMYD TRACH DNA AMP PROBE: CPT | Performed by: EMERGENCY MEDICINE

## 2020-02-22 PROCEDURE — 84702 CHORIONIC GONADOTROPIN TEST: CPT | Performed by: EMERGENCY MEDICINE

## 2020-02-22 PROCEDURE — 87591 N.GONORRHOEAE DNA AMP PROB: CPT | Performed by: EMERGENCY MEDICINE

## 2020-02-22 PROCEDURE — 76801 OB US < 14 WKS SINGLE FETUS: CPT

## 2020-02-22 PROCEDURE — 96360 HYDRATION IV INFUSION INIT: CPT

## 2020-02-22 RX ADMIN — SODIUM CHLORIDE 500 ML: 9 INJECTION, SOLUTION INTRAVENOUS at 14:31

## 2020-02-22 ASSESSMENT — ENCOUNTER SYMPTOMS
ABDOMINAL PAIN: 1
VOMITING: 0
DYSURIA: 0
FEVER: 0
DIARRHEA: 1

## 2020-02-22 NOTE — ED PROVIDER NOTES
History     Chief Complaint:    Vaginal Pain    A phone   was used obtain the above history as well as to explain diagnosis, plan of treatment, reasons to return to the emergency department and appropriate follow up.    CRISTOFER Westbrook is a 29 year old female who presents with vaginal pain. The patient notes that her last period was on 12/30/2020 and his since found out to be pregnant. She states that she noticed a green colored discharge yesterday after urinating and has since felt burning without urination, nausea, as well diarrhea as of two days ago, and lower left intermittent abdominal pain for the previous 5-6 days. She denies any dysuria, vomiting, fever, or vaginal bleeding. Of note the patient has an OB appointment in 2 days and is blood type A +.    Allergies:  No Known Drug Allergies    Medications:    Imitrex  Prenatal vitamins    Past Medical History:    LSIL  PCOS  Vitamin D deficiency  Insomnia  Thyromegaly   H. Pylori infection   Migraine    Past Surgical History:    Dilation and curettage suction     Family History:    Father: hypothyroidism     Social History:  The patient was accompanied to the ED by her .  Smoking Status: Never Smoker  Smokeless Tobacco: Never Used  Alcohol Use: Negative   Drug Use: Negative  PCP: Tonia Warren  Marital Status:      Review of Systems   Constitutional: Negative for fever.   Gastrointestinal: Positive for abdominal pain and diarrhea. Negative for vomiting.   Genitourinary: Positive for vaginal discharge. Negative for dysuria and vaginal bleeding.   All other systems reviewed and are negative.        Physical Exam   First Vitals: Vital signs:  Patient Vitals for the past 24 hrs:   BP Temp Temp src Heart Rate Resp SpO2 Weight   02/22/20 1333 (!) 142/86 98.3  F (36.8  C) Oral 101 16 100 % 59.4 kg (130 lb 15.3 oz)     Physical Exam    Nursing note and vitals reviewed.    Constitutional: Pleasant and well groomed.          HENT:     Mouth/Throat: Oropharynx is without swelling or erythema. Oral mucosa moist.    Eyes: Conjunctivae are normal. No scleral icterus.    Neck: Neck supple.   Cardiovascular: Normal rate, regular rhythm and intact distal pulses.    Pulmonary/Chest: Effort normal and breath sounds normal.   Abdominal: Soft.  No distension. There is no tenderness to palpation.   Musculoskeletal:  No edema, No calf tenderness  Genitourinary: Normal external genitalia. No lesions.Small amount of yellow/white vaginal discharge.  Normal appearing cervix. No cervical motion tenderness. No adnexal tenderness/masses appreciated.   Neurological:Alert. Coordination normal.   Skin: Skin is warm and dry.   Psychiatric: Normal mood and affect.         Emergency Department Course     Imaging:  Radiology findings were communicated with the patient who voiced understanding of the findings.    US OB < 14 Weeks Single  1.  Single living intrauterine gestation at 6 weeks 6 days, EDC  10/11/2020.    TERE GAMEZ MD  Reading per radiology       Laboratory:  Laboratory findings were communicated with the patient who voiced understanding of the findings.    HC (H)   CBC: WBC 7.1, HGB 13.2,   Wet prep: WNL   Chlamydia: IP  Gonorrhea: IP    Interventions:  1431  mL Iv    Emergency Department Course:    1348 Nursing notes and vitals reviewed.    1354 I performed an exam of the patient as documented above.     1449 The patient was sent for a US while in the emergency department, results above.     1540 I performed a pelvic exam with interpretor ipad and female chaperone present.     1628 Findings and plan explained to the Patient. Patient discharged home with instructions regarding supportive care, medications, and reasons to return.  That patient's  confesses that he is a  at an outside hospital and that he performed a microscopic evaluation and was able to see yeast with no further abnormalities.  We discussed the  topical management of Candida vaginitis and I offered them recommendations.  Also recommended conversation at follow-up with OB this week as scheduled.       Impression & Plan     Medical Decision Making:  This 29 year old female presents with vaginal discharge at seven weeks pregnant. She did also described moises left lower quadrant pain that has been present over the last few days. She reports hat she has vaginal discharge like this and had been diagnosed with a yeast infection in her previous two pregnancies and therefore was concern that this was what was going on at this time. I did also ectopic pregnancies with the abdominal symptoms she is describing. Labs are noted as above and reassuring. Pelvic US revealed an IUP with a normal left ovary. Her abdominal exam remains benign.  A wet prep is negative. A GC and chlamydia is pending, however I am  concerned by the findings on the exam. She has a follow up appointment in two days and I recommend she discuss the findings at that time. We will contact her if the gonorrhea and chlamydia test are positive. She understanding tho return with any new or worsening symptoms. Otherwise follow up in two days with her previously scheduled appointment.     Diagnosis:    ICD-10-CM    1. Abdominal pain, left lower quadrant R10.32    2. Vaginal discharge N89.8    3. Pregnant state, incidental Z33.1        Disposition:  The patient is discharged to home.    Scribe Disclosure:  I, Ramiro Dubon, am serving as a scribe at 1:54 PM on 2/22/2020 to document services personally performed by Layne Combs MD based on my observations and the provider's statements to me.       [facility] EMERGENCY DEPARTMENT       Layne Combs MD  02/25/20 [id number]

## 2020-02-22 NOTE — ED TRIAGE NOTES
Abnormal for patient, greenish vaginal discharge and itching.  No fever, no pain.  Pt is approximately 7 weeks pregnant.

## 2020-02-22 NOTE — ED AVS SNAPSHOT
[facility] Emergency Department  [address]  [facility] [address]  Phone:  [phone]  Fax:  [phone]                                    Alexsandra Westbrook   MRN: [id number]    Department:  [facility] Emergency Department   Date of Visit:  2/22/2020           After Visit Summary Signature Page    I have received my discharge instructions, and my questions have been answered. I have discussed any challenges I see with this plan with the nurse or doctor.    ..........................................................................................................................................  Patient/Patient Representative Signature      ..........................................................................................................................................  Patient Representative Print Name and Relationship to Patient    ..................................................               ................................................  Date                                   Time    ..........................................................................................................................................  Reviewed by Signature/Title    ...................................................              ..............................................  Date                                               Time          22EPIC Rev 08/18

## 2020-02-23 LAB
C TRACH DNA SPEC QL NAA+PROBE: NEGATIVE
N GONORRHOEA DNA SPEC QL NAA+PROBE: NEGATIVE
SPECIMEN SOURCE: NORMAL
SPECIMEN SOURCE: NORMAL

## 2020-02-24 ENCOUNTER — PRENATAL OFFICE VISIT (OUTPATIENT)
Dept: NURSING | Facility: CLINIC | Age: 30
End: 2020-02-24
Payer: COMMERCIAL

## 2020-02-24 ENCOUNTER — TRANSCRIBE ORDERS (OUTPATIENT)
Dept: MATERNAL FETAL MEDICINE | Facility: CLINIC | Age: 30
End: 2020-02-24

## 2020-02-24 VITALS
TEMPERATURE: 98 F | SYSTOLIC BLOOD PRESSURE: 107 MMHG | HEIGHT: 62 IN | DIASTOLIC BLOOD PRESSURE: 68 MMHG | HEART RATE: 79 BPM | BODY MASS INDEX: 23.3 KG/M2 | WEIGHT: 126.6 LBS

## 2020-02-24 DIAGNOSIS — O26.90 PREGNANCY RELATED CONDITION, ANTEPARTUM: Primary | ICD-10-CM

## 2020-02-24 DIAGNOSIS — Z34.90 SUPERVISION OF NORMAL PREGNANCY: Primary | ICD-10-CM

## 2020-02-24 DIAGNOSIS — Z23 NEED FOR TDAP VACCINATION: ICD-10-CM

## 2020-02-24 LAB
ABO + RH BLD: NORMAL
ABO + RH BLD: NORMAL
ALBUMIN UR-MCNC: NEGATIVE MG/DL
APPEARANCE UR: CLEAR
BILIRUB UR QL STRIP: NEGATIVE
BLD GP AB SCN SERPL QL: NORMAL
BLOOD BANK CMNT PATIENT-IMP: NORMAL
COLOR UR AUTO: YELLOW
DEPRECATED CALCIDIOL+CALCIFEROL SERPL-MC: 20 UG/L (ref 20–75)
GLUCOSE UR STRIP-MCNC: NEGATIVE MG/DL
HBV SURFACE AG SERPL QL IA: NONREACTIVE
HGB UR QL STRIP: NEGATIVE
HIV 1+2 AB+HIV1 P24 AG SERPL QL IA: NONREACTIVE
KETONES UR STRIP-MCNC: NEGATIVE MG/DL
LEUKOCYTE ESTERASE UR QL STRIP: NEGATIVE
NITRATE UR QL: NEGATIVE
PH UR STRIP: 6.5 PH (ref 5–7)
SOURCE: NORMAL
SP GR UR STRIP: <=1.005 (ref 1–1.03)
SPECIMEN EXP DATE BLD: NORMAL
T4 FREE SERPL-MCNC: 1.52 NG/DL (ref 0.76–1.46)
TSH SERPL DL<=0.005 MIU/L-ACNC: 0.07 MU/L (ref 0.4–4)
UROBILINOGEN UR STRIP-ACNC: 0.2 EU/DL (ref 0.2–1)

## 2020-02-24 PROCEDURE — 36415 COLL VENOUS BLD VENIPUNCTURE: CPT | Performed by: OBSTETRICS & GYNECOLOGY

## 2020-02-24 PROCEDURE — 86780 TREPONEMA PALLIDUM: CPT | Performed by: OBSTETRICS & GYNECOLOGY

## 2020-02-24 PROCEDURE — 84443 ASSAY THYROID STIM HORMONE: CPT | Performed by: OBSTETRICS & GYNECOLOGY

## 2020-02-24 PROCEDURE — 99207 ZZC NO CHARGE NURSE ONLY: CPT

## 2020-02-24 PROCEDURE — 86850 RBC ANTIBODY SCREEN: CPT | Performed by: OBSTETRICS & GYNECOLOGY

## 2020-02-24 PROCEDURE — 86762 RUBELLA ANTIBODY: CPT | Performed by: OBSTETRICS & GYNECOLOGY

## 2020-02-24 PROCEDURE — 81003 URINALYSIS AUTO W/O SCOPE: CPT | Performed by: OBSTETRICS & GYNECOLOGY

## 2020-02-24 PROCEDURE — 87340 HEPATITIS B SURFACE AG IA: CPT | Performed by: OBSTETRICS & GYNECOLOGY

## 2020-02-24 PROCEDURE — 84439 ASSAY OF FREE THYROXINE: CPT | Performed by: OBSTETRICS & GYNECOLOGY

## 2020-02-24 PROCEDURE — 86900 BLOOD TYPING SEROLOGIC ABO: CPT | Performed by: OBSTETRICS & GYNECOLOGY

## 2020-02-24 PROCEDURE — 82306 VITAMIN D 25 HYDROXY: CPT | Performed by: OBSTETRICS & GYNECOLOGY

## 2020-02-24 PROCEDURE — 87389 HIV-1 AG W/HIV-1&-2 AB AG IA: CPT | Performed by: OBSTETRICS & GYNECOLOGY

## 2020-02-24 PROCEDURE — 87086 URINE CULTURE/COLONY COUNT: CPT | Performed by: OBSTETRICS & GYNECOLOGY

## 2020-02-24 PROCEDURE — 86901 BLOOD TYPING SEROLOGIC RH(D): CPT | Performed by: OBSTETRICS & GYNECOLOGY

## 2020-02-24 ASSESSMENT — MIFFLIN-ST. JEOR: SCORE: 1248.53

## 2020-02-24 NOTE — PROGRESS NOTES
Important Information for Provider:     Patient presents for new ob teaching and labs, third pregnancy. History of SAB 2017. Ultrasound performed at [facility] 2/22/2020, viable pregnancy. Handouts reviewed and given. Ordered first trimester screening. Has NOB with Dr Staples 3/10/2020. TSH drawn today with NOB labs    Caffeine intake/servings daily - 0  Calcium intake/servings daily - 3  Exercise 0 times weekly - describe ; encouraged using treadmill at home  Sunscreen used - Yes  Seatbelts used - Yes  Guns stored in the home - No  Self Breast Exam - Yes  Pap test up to date -  Yes  Eye exam up to date -  Yes  Dental exam up to date -  Yes  Immunizations reviewed and up to date - Yes  Abuse: Current or Past (Physical, Sexual or Emotional) - No  Do you feel safe in your environment - Yes  Do you cope well with stress - Yes  Do you suffer from insomnia - No        Prenatal OB Questionnaire  Patient supplied answers from flow sheet for:  Prenatal OB Questionnaire.  Past Medical History  Diabetes?: No  Hypertension : No  Heart disease, mitral valve prolapse or rheumatic fever?: No  An autoimmune disease such as lupus or rheumatoid arthritis?: No  Kidney disease or urinary tract infection?: No  Epilepsy, seizures or spells?: No  Migraine headaches?: No  A stroke or loss of function or sensation?: No  Any other neurological problems?: No  Have you ever been treated for depression?: No  Are you having problems with crying spells or loss of self-esteem?: No  Have you ever required psychiatric care?: No  Have you ever had hepatitis, liver disease or jaundice?: No  Have you been treated for blood clots in your veins, deep vein thrombosis, inflammation in the veins, thrombosis, phlebitis, pulmonary embolism or varicosities?: No  Have you had excessive bleeding after surgery or dental work?: No  Do you bleed more than other women after a cut or scratch?: No  Do you have a history of anemia?: No  Have you ever had thyroid problems or  taken thyroid medication?: No   Do you have any endocrine problems?: Yes PCOS, Thromegaly  Have you ever been in a major accident or suffered serious trauma?: No  Within the last year, has anyone hit, slapped, kicked or otherwise hurt you?: No  In the last year, has anyone forced you to have sex when you didn't want to?: No    Past Medical History 2   Have you ever received a blood transfusion?: No  Would you refuse a blood transfusion if a doctor judged it to be medically necessary?: No   If you answered Yes, would you rather die than receive a blood transfusion?: No  If you answered Yes, is this for Restoration reasons?: No  Does anyone in your home smoke?: No  Do you use tobacco products?: No  Do you drink beer, wine or hard liquor?: No  Do you use any of the following: marijuana, speed, cocaine, heroin, hallucinogens or other drugs?: No   Is your blood type Rh negative?: No  Have you ever had abnormal antibodies in your blood?: No  Have you ever had asthma?: No  Have you ever had tuberculosis?: No  Do you have any allergies to drugs or over-the-counter medications?: No  Allergies: Dust Mites, Aspartame, Ethanol, Venlafaxine, Hydrochloride, Sertraline: No  Have you had any breast problems?: No  Have you ever ?: (!) Yes  Have you had any gynecological surgical procedures such as cervical conization, a LEEP procedure, laser treatment, cryosurgery of the cervix or a dilation and curettage, etc?: (!) Yes(D&C 2015)  Have you ever had any other surgical procedures?: No  Have you been hospitalized for a nonsurgical reason excluding normal delivery?: No  Have you ever had any anesthetic complications?: No  Have you ever had an abnormal pap smear?: No    Past Medical History (Continued)  Do you have a history of abnormalities of the uterus?: No  Did your mother take KEIRY or any other hormones when she was pregnant with you?: No  Did it take you more than a year to become pregnant?: No  Have you ever been evaluated  or treated for infertility?: No  Is there a history of medical problems in your family, which you feel may be important to this pregnancy?: No  Do you have any other problems we have not asked about which you feel may be important to this pregnancy?: No    Symptoms since last menstrual period  Do you have any of the following symptoms: abdominal pain, blood in stools or urine, chest pain, shortness of breath, coughing or vomiting up blood, your heart racing or skipping beats, nausea and vomiting, pain on urination or vaginal discharge or bleed: (!) Yes  Will the patient be 35 years old or older at the time of delivery?: No    Has the patient, baby's father or anyone in either family had:  Thalassemia (Italian, Greek, Mediterranean or  background only) and an MCV result less than 80?: No  Neural tube defect such as meningomyelocele, spina bifida or anencephaly?: No  Congenital heart defect?: No  Down's Syndrome?: No  Dallas-Sachs disease (Buddhism, Cajun, Tongan-Lao)?: No  Sickle cell disease or trait ()?: No  Hemophilia or other inherited problems of blood?: No  Muscular dystrophy?: No  Cystic fibrosis?: No  [place]'s chorea?: No  Mental retardation/autism?: No  If yes, was the person tested for fragile X?: No  Any other inherited genetic or chromosomal disorder?: No  Maternal metabolic disorder (e.g Insulin-dependent diabetes, PKU)?: No  A child with birth defects not listed above?: No  Recurrent pregnancy loss or stillbirth?: No   Has the patient had any medications/street drugs/alcohol since her last menstrual period?: No  Does the patient or baby's father have any other genetic risks?: No    Infection History   Do you object to being tested for Hepatitis B?: No  Do you object to being tested for HIV?: No   Do you feel that you are at high risk for coming in contact with the AIDS virus?: No  Have you ever been treated for tuberculosis?: No  Have you ever had a positive skin test for tuberculosis?:  No  Do you live with someone who has tuberculosis?: No  Have you ever been exposed to tuberculosis?: No  Do you have genital herpes?: No  Does your partner have genital herpes?: No  Have you had a viral illness since your last period?: No  Have you ever had gonorrhea, chlamydia, syphilis, venereal warts, trichomoniasis, pelvic inflammatory disease or any other sexually transmitted disease?: No  Do you know if you are a genital group B streptococcus carrier?: No  Have you had chicken pox/varicella?: No   Have you been vaccinated against chicken Pox?: No  Have you had any other infectious diseases?: No      Allergies as of 2/24/2020:    Allergies as of 02/24/2020     (No Known Allergies)       Current medications are:  No current outpatient medications on file.         Early ultrasound screening tool:    Does patient have irregular periods?  No  Did patient use hormonal birth control in the three months prior to positive urine pregnancy test? No  Is the patient breastfeeding?  No  Is the patient 10 weeks or greater at time of education visit?  No

## 2020-02-25 LAB
BACTERIA SPEC CULT: NORMAL
RUBV IGG SERPL IA-ACNC: 36 IU/ML
SPECIMEN SOURCE: NORMAL
T PALLIDUM AB SER QL: NONREACTIVE

## 2020-03-10 ENCOUNTER — OFFICE VISIT (OUTPATIENT)
Dept: OBGYN | Facility: CLINIC | Age: 30
End: 2020-03-10
Payer: COMMERCIAL

## 2020-03-10 VITALS
WEIGHT: 126 LBS | HEART RATE: 95 BPM | BODY MASS INDEX: 23.23 KG/M2 | DIASTOLIC BLOOD PRESSURE: 72 MMHG | SYSTOLIC BLOOD PRESSURE: 113 MMHG | OXYGEN SATURATION: 96 %

## 2020-03-10 DIAGNOSIS — R10.9 CRAMPING AFFECTING PREGNANCY, ANTEPARTUM: Primary | ICD-10-CM

## 2020-03-10 DIAGNOSIS — O26.899 CRAMPING AFFECTING PREGNANCY, ANTEPARTUM: Primary | ICD-10-CM

## 2020-03-10 PROCEDURE — 99213 OFFICE O/P EST LOW 20 MIN: CPT | Performed by: OBSTETRICS & GYNECOLOGY

## 2020-03-10 NOTE — PROGRESS NOTES
S:  Alexsandra presents for evaluation of abdominal pain in pregnancy.   Has had similar pain with her previous pregnancies.   Had normal ultrasound on 2/22/2020.  Pain is worsened by sneezing. Pain is across her lower abdomen.  Comes and goes.   Bowel movements have been a little loose.   H/o miscarriage x 2.   Unsure LMP, conceived spontaneously. Previously tried letrozole and it didn't work.   No fevers.   Hasn't changed over last few weeks.     Did have low TSH, high T4 at her new prenatal labs.     Past Medical History:   Diagnosis Date     LSIL (low grade squamous intraepithelial lesion) on Pap smear 7/2014    age 23     PCOS (polycystic ovarian syndrome)      Vitamin D deficiency      Past Surgical History:   Procedure Laterality Date     DILATION AND CURETTAGE SUCTION N/A 4/7/2015    Procedure: DILATION AND CURETTAGE SUCTION;  Surgeon: Hafsa Myers MD;  Location: [facility] OR     Current Outpatient Medications   Medication     Prenatal Vit-Fe Fumarate-FA (PRENATAL COMPLETE PO)     No current facility-administered medications for this visit.       No Known Allergies      O:  Vitals:    03/10/20 1033   BP: 113/72   Pulse: 95   SpO2: 96%   Weight: 57.2 kg (126 lb)     Gen: well appearing  Resp: no increased work of breathing  Abd:soft, NT, ND    Bedside US: viable romero IUP c/w prior dates, positive cardiac activity.       US OB < 14 WEEKS SINGLE 2/22/2020 3:26 PM     CLINICAL HISTORY: LLQ abd pain, pregnant  TECHNIQUE: Transabdominal scans were performed. Endovaginal ultrasound  was performed to better visualize the embryo.     COMPARISON: No recent relevant prior exams.     FINDINGS:  UTERUS: Single normal appearing intrauterine gestation sac.  CRL: measures 8.6 mm, equals 6 weeks 6 days.  FETAL HEART RATE: 142 bpm.  AMNIOTIC FLUID: Normal.  PLACENTA: Not yet formed. No evidence for sub-chorionic hemorrhage.     RIGHT OVARY: 4.0 x 4.4 cm cyst.  LEFT OVARY: Normal.                                                                       IMPRESSION:   1.  Single living intrauterine gestation at 6 weeks 6 days, EDC  10/11/2020.        TERE GAMEZ MD        A/P:  29 year old  with cramping in pregnancy.   Discussed benign exam and reassuring bedside ultrasound findings.   Consistent with experience from prior pregnancies.   No warning signs, no signs of infections.   Recommended regular follow-up, especially in light of prior miscarriages.   Check TSH next visit, too soon to retest now.  RTC 3 weeks.

## 2020-03-26 ENCOUNTER — PRE VISIT (OUTPATIENT)
Dept: MATERNAL FETAL MEDICINE | Facility: CLINIC | Age: 30
End: 2020-03-26

## 2020-03-27 ENCOUNTER — NURSE TRIAGE (OUTPATIENT)
Dept: NURSING | Facility: CLINIC | Age: 30
End: 2020-03-27

## 2020-03-27 ENCOUNTER — VIRTUAL VISIT (OUTPATIENT)
Dept: MATERNAL FETAL MEDICINE | Facility: CLINIC | Age: 30
End: 2020-03-27
Attending: OBSTETRICS & GYNECOLOGY
Payer: COMMERCIAL

## 2020-03-27 ENCOUNTER — TELEPHONE (OUTPATIENT)
Dept: MATERNAL FETAL MEDICINE | Facility: CLINIC | Age: 30
End: 2020-03-27

## 2020-03-27 DIAGNOSIS — Z36.9 ENCOUNTER FOR ANTENATAL SCREENING OF MOTHER: Primary | ICD-10-CM

## 2020-03-27 DIAGNOSIS — O26.90 PREGNANCY RELATED CONDITION, ANTEPARTUM: ICD-10-CM

## 2020-03-27 PROCEDURE — 40000072 ZZH STATISTIC GENETIC COUNSELING, < 16 MIN: Mod: TEL,ZF | Performed by: GENETIC COUNSELOR, MS

## 2020-03-27 NOTE — TELEPHONE ENCOUNTER
Were told to call our service by OB clinic to be evaluated for upcoming appointment due to cough. This has been ongoing for 2-3 weeks, coughing only 2-3 times and it not accompanied by any other other symptoms. Triaged to home care and instructed on COVID-19 self cares.      COVID 19 Nurse Triage Plan    Please be aware that novel coronavirus (COVID-19) may be circulating in the community. If you develop symptoms such as fever, cough, or SOB or if you have concerns about the presence of another infection including coronavirus (COVID-19), please contact your health care provider or visit [email or web address].     Patient HAS NO known exposure only  mild cough.   Additional General Information About COVID-19    COVID-19 - General Information:  Regardless of if you have been tested or not:  Patient who have symptoms (cough, fever, or shortness of breath), need to isolate for 7 days from when symptoms started AND 72 hours after fever resolves (without fever reducing medications) AND improvement of respiratory symptoms (whichever is longer).      Isolate yourself at home (in own room/own bathroom if possible)    Do Not allow any visitors    Do Not go to work or school    Do Not go to [employer],  centers, shopping, or other public places.    Do Not shake hands.    Avoid close and intimate contact with others (hugging, kissing).    Follow CDC recommendations for household cleaning of frequently touched services.     After the initial 7 days, continue to isolate yourself from household members as much as possible. To continue decrease the risk of community spread and exposure, you and any members of your household should limit activities in public for 14 days after starting home isolation.     You can reference the following CDC link for helpful home isolation/care tips:  https://www.cdc.gov/coronavirus/2019-ncov/downloads/10Things.pdf    COVID-19 - Symptoms:     The COVID-19 can cause a respiratory illness, such as  bronchitis or pneumonia.    The most common symptoms are: cough, fever, and shortness of breath.     Other symptoms are: body aches, chills, diarrhea, fatigue, headache, runny nose, and sore throat     COVID-19 - Exposure Risk Factors:    Exposure to a person who has been diagnosed with COVID-19 .    Travel from an area with recent local transmission of COVID-19 .    The CDC (www.cdc.gov) has the most up-to-date list of where the COVID-19 outbreak is occurring.    COVID-19 - Spreading:     The virus likely spreads through respiratory droplets produced when a person coughs or sneezes. These respiratory droplets can travel approximately 6 feet and can remain on surfaces.  Common disinfectants will kill the virus.    The CDC currently does not recommend healthy people wearing masks.    COVID-19 - Protect Yourself:     Avoid close contact with people known to have this new COVID-19 infection.    Wash hands often with soap and water or alcohol-based hand .    Avoid touching the eyes, nose or mouth.       Thank you for limiting contact with others, wearing a simple mask to cover your cough, practice good hand hygiene habits and accessing our virtual services where possible to limit the spread of this virus.    For more information about COVID19 and options for caring for yourself at home, please visit the CDC website at https://www.cdc.gov/coronavirus/2019-ncov/about/steps-when-sick.html  For more options for care at [facility], please visit our website at [email or web address]          Additional Information    Negative: Bluish (or gray) lips or face    Negative: Severe difficulty breathing (e.g., struggling for each breath, speaks in single words)    Negative: Rapid onset of cough and has hives    Negative: Coughing started suddenly after medicine, an allergic food or bee sting    Negative: Difficulty breathing after exposure to flames, smoke, or fumes    Negative: Sounds like a  life-threatening emergency to the triager    Negative: Previous asthma attacks and this feels like asthma attack    Negative: Chest pain present when not coughing    Negative: Difficulty breathing    Negative: Passed out (i.e., fainted, collapsed and was not responding)    Negative: Patient sounds very sick or weak to the triager    Negative: Coughed up > 1 tablespoon (15 ml) blood (Exception: blood-tinged sputum)    Negative: Fever > 103 F (39.4 C)    Negative: Fever > 101 F (38.3 C) and over 60 years of age    Negative: Fever > 100.0 F (37.8 C) and has diabetes mellitus or a weak immune system (e.g., HIV positive, cancer chemotherapy, organ transplant, splenectomy, chronic steroids)    Negative: Fever > 100.0 F (37.8 C) and bedridden (e.g., nursing home patient, stroke, chronic illness, recovering from surgery)    Negative: Increasing ankle swelling    Negative: Wheezing is present    Negative: SEVERE coughing spells (e.g., whooping sound after coughing, vomiting after coughing)    Negative: Coughing up mckenna-colored (reddish-brown) or blood-tinged sputum    Negative: Fever present > 3 days (72 hours)    Negative: Fever returns after gone for over 24 hours and symptoms worse or not improved    Negative: Using nasal washes and pain medicine > 24 hours and sinus pain persists    Negative: Known COPD or other severe lung disease (i.e., bronchiectasis, cystic fibrosis, lung surgery) and worsening symptoms (i.e., increased sputum purulence or amount, increased breathing difficulty)    Negative: Continuous (nonstop) coughing interferes with work or school and no improvement using cough treatment per Care Advice    Negative: Patient wants to be seen    Negative: Cough has been present for > 3 weeks    Negative: Allergy symptoms are also present (e.g., itchy eyes, clear nasal discharge, postnasal drip)    Negative: Nasal discharge present > 10 days    Negative: Exposure to TB (Tuberculosis)    Negative: Taking an ACE  "Inhibitor medication (e.g., benazepril/LOTENSIN, captopril/CAPOTEN, enalapril/VASOTEC, lisinopril/ZESTRIL)    Cough with no complications    Answer Assessment - Initial Assessment Questions  1. ONSET: \"When did the cough begin?\"    2- 3 weeks ago  2. SEVERITY: \"How bad is the cough today?\"       \"not bad\"  3. RESPIRATORY DISTRESS: \"Describe your breathing.\"       No  4. FEVER: \"Do you have a fever?\" If so, ask: \"What is your temperature, how was it measured, and when did it start?\"      No  5. HEMOPTYSIS: \"Are you coughing up any blood?\" If so ask: \"How much?\" (flecks, streaks, tablespoons, etc.)      No  6. TREATMENT: \"What have you done so far to treat the cough?\" (e.g., meds, fluids, humidifier)      Nothing  7. CARDIAC HISTORY: \"Do you have any history of heart disease?\" (e.g., heart attack, congestive heart failure)        No  8. LUNG HISTORY: \"Do you have any history of lung disease?\"  (e.g., pulmonary embolus, asthma, emphysema)      No  9. PE RISK FACTORS: \"Do you have a history of blood clots?\" (or: recent major surgery, recent prolonged travel, bedridden )      No  10. OTHER SYMPTOMS: \"Do you have any other symptoms? (e.g., runny nose, wheezing, chest pain)         No  11. PREGNANCY: \"Is there any chance you are pregnant?\" \"When was your last menstrual period?\"        Yes: 12 weeks  12. TRAVEL: \"Have you traveled out of the country in the last month?\" (e.g., travel history, exposures)        No    Protocols used: COUGH-A-OH      "

## 2020-03-30 ENCOUNTER — APPOINTMENT (OUTPATIENT)
Dept: ULTRASOUND IMAGING | Facility: CLINIC | Age: 30
End: 2020-03-30
Attending: EMERGENCY MEDICINE
Payer: COMMERCIAL

## 2020-03-30 ENCOUNTER — HOSPITAL ENCOUNTER (EMERGENCY)
Facility: CLINIC | Age: 30
Discharge: HOME | End: 2020-03-30
Attending: EMERGENCY MEDICINE | Admitting: EMERGENCY MEDICINE
Payer: COMMERCIAL

## 2020-03-30 VITALS
RESPIRATION RATE: 16 BRPM | OXYGEN SATURATION: 98 % | HEART RATE: 83 BPM | DIASTOLIC BLOOD PRESSURE: 80 MMHG | SYSTOLIC BLOOD PRESSURE: 120 MMHG | TEMPERATURE: 98.5 F

## 2020-03-30 DIAGNOSIS — R10.2 PELVIC PAIN AFFECTING PREGNANCY IN FIRST TRIMESTER, ANTEPARTUM: ICD-10-CM

## 2020-03-30 DIAGNOSIS — O26.891 PELVIC PAIN AFFECTING PREGNANCY IN FIRST TRIMESTER, ANTEPARTUM: ICD-10-CM

## 2020-03-30 DIAGNOSIS — O20.9 VAGINAL BLEEDING IN PREGNANCY, FIRST TRIMESTER: ICD-10-CM

## 2020-03-30 LAB
ALBUMIN UR-MCNC: NEGATIVE MG/DL
APPEARANCE UR: CLEAR
B-HCG SERPL-ACNC: ABNORMAL IU/L (ref 0–5)
BASOPHILS # BLD AUTO: 0 10E9/L (ref 0–0.2)
BASOPHILS NFR BLD AUTO: 0.2 %
BILIRUB UR QL STRIP: NEGATIVE
COLOR UR AUTO: ABNORMAL
DIFFERENTIAL METHOD BLD: NORMAL
EOSINOPHIL # BLD AUTO: 0.1 10E9/L (ref 0–0.7)
EOSINOPHIL NFR BLD AUTO: 1.7 %
ERYTHROCYTE [DISTWIDTH] IN BLOOD BY AUTOMATED COUNT: 11.7 % (ref 10–15)
GLUCOSE UR STRIP-MCNC: NEGATIVE MG/DL
HCT VFR BLD AUTO: 38.8 % (ref 35–47)
HGB BLD-MCNC: 12.9 G/DL (ref 11.7–15.7)
HGB UR QL STRIP: NEGATIVE
IMM GRANULOCYTES # BLD: 0 10E9/L (ref 0–0.4)
IMM GRANULOCYTES NFR BLD: 0.3 %
KETONES UR STRIP-MCNC: NEGATIVE MG/DL
LEUKOCYTE ESTERASE UR QL STRIP: NEGATIVE
LYMPHOCYTES # BLD AUTO: 2.1 10E9/L (ref 0.8–5.3)
LYMPHOCYTES NFR BLD AUTO: 33 %
MCH RBC QN AUTO: 29.8 PG (ref 26.5–33)
MCHC RBC AUTO-ENTMCNC: 33.2 G/DL (ref 31.5–36.5)
MCV RBC AUTO: 90 FL (ref 78–100)
MONOCYTES # BLD AUTO: 0.5 10E9/L (ref 0–1.3)
MONOCYTES NFR BLD AUTO: 7.3 %
MUCOUS THREADS #/AREA URNS LPF: PRESENT /LPF
NEUTROPHILS # BLD AUTO: 3.7 10E9/L (ref 1.6–8.3)
NEUTROPHILS NFR BLD AUTO: 57.5 %
NITRATE UR QL: NEGATIVE
NRBC # BLD AUTO: 0 10*3/UL
NRBC BLD AUTO-RTO: 0 /100
PH UR STRIP: 5.5 PH (ref 5–7)
PLATELET # BLD AUTO: 317 10E9/L (ref 150–450)
RBC # BLD AUTO: 4.33 10E12/L (ref 3.8–5.2)
RBC #/AREA URNS AUTO: <1 /HPF (ref 0–2)
SOURCE: ABNORMAL
SP GR UR STRIP: 1.01 (ref 1–1.03)
SQUAMOUS #/AREA URNS AUTO: 1 /HPF (ref 0–1)
UROBILINOGEN UR STRIP-MCNC: NORMAL MG/DL (ref 0–2)
WBC # BLD AUTO: 6.4 10E9/L (ref 4–11)
WBC #/AREA URNS AUTO: <1 /HPF (ref 0–5)

## 2020-03-30 PROCEDURE — 81001 URINALYSIS AUTO W/SCOPE: CPT | Performed by: EMERGENCY MEDICINE

## 2020-03-30 PROCEDURE — 85025 COMPLETE CBC W/AUTO DIFF WBC: CPT | Performed by: EMERGENCY MEDICINE

## 2020-03-30 PROCEDURE — 76801 OB US < 14 WKS SINGLE FETUS: CPT

## 2020-03-30 PROCEDURE — 84702 CHORIONIC GONADOTROPIN TEST: CPT | Performed by: EMERGENCY MEDICINE

## 2020-03-30 PROCEDURE — 99285 EMERGENCY DEPT VISIT HI MDM: CPT | Mod: 25

## 2020-03-30 PROCEDURE — 25000132 ZZH RX MED GY IP 250 OP 250 PS 637: Performed by: EMERGENCY MEDICINE

## 2020-03-30 RX ORDER — ACETAMINOPHEN 325 MG/1
650 TABLET ORAL ONCE
Status: COMPLETED | OUTPATIENT
Start: 2020-03-30 | End: 2020-03-30

## 2020-03-30 RX ADMIN — ACETAMINOPHEN 650 MG: 325 TABLET, FILM COATED ORAL at 10:44

## 2020-03-30 ASSESSMENT — ENCOUNTER SYMPTOMS
VOMITING: 0
NAUSEA: 0
ABDOMINAL PAIN: 1

## 2020-03-30 NOTE — ED TRIAGE NOTES
Pt presents with vaginal bleeding and pain, currently 12 weeks pregnant. Pt alert, oriented x3 ABCs intact

## 2020-03-30 NOTE — DISCHARGE INSTRUCTIONS
Return to ER immediately if you develop: worsening bleeding (>1 pad or tampon per hour), severe abdominal or pelvic pain, new lightheadedness/dizziness/shortness of breath, Fever > 101, persistent nausea or vomiting OR you have any other concerns about your health.

## 2020-03-30 NOTE — ED PROVIDER NOTES
History     Chief Complaint:  Abdominal Pain; Vaginal Bleeding    The history is provided by the patient. The history is limited by a language barrier. A  was used (virtual).      Alexsandra Westbrook is a 12-weeks pregnant 29 year old female ( A2) with a history of miscarriage who presents to the emergency department for evaluation of abdominal pain and vaginal bleeding. The patient reports she has experienced several days of intermittent abdominal pain, accompanied by vaginal bleeding since last night. She states the blood was first noted after urination, when wiping, but she noted some vaginal bleeding this morning, with small clots around the size of her fingertips. The patient denies any tissue discharge, nausea, or vomiting. She notes she is seen primarily at [facility] for OB/Gyn care, with US performed at 6 weeks, 6 days. The patient denies any thoughts of self harm. She has not taken any medication for pain management today.    Allergies:  NKDA     Medications:    Prenatal vitamins     Past Medical History:    PCOS  Thyromegaly  Migraine  H pylori    Past Surgical History:    D&C    Family History:    Hypothyroidism  Enlarged thyroid    Social History:  Presents alone.  Never smoker.  Negative for alcohol use.  Marital Status:  Single [1]     Review of Systems   Gastrointestinal: Positive for abdominal pain. Negative for nausea and vomiting.   Genitourinary: Positive for vaginal bleeding. Negative for vaginal discharge.   All other systems reviewed and are negative.        Physical Exam     Patient Vitals for the past 24 hrs:   BP Temp Temp src Pulse Heart Rate Resp SpO2   20 1226 -- -- -- -- -- -- 98 %   20 1225 120/80 -- -- 83 -- -- --   20 1028 (!) 130/111 98.5  F (36.9  C) Oral 102 102 16 100 %       Physical Exam  Gen: well appearing, in no acute distress  HEENT:  mmm, no rhinorrhea  Neck: supple, no abnormal swelling  Lungs:  CTAB,  no resp distress  CV: rrr, no m/r/g,  ppi  Abd: soft, nontender, nondistended, no rebound/masses/guarding/hsm  Pelvic: Exam done with female chaperone in room (Ert/Rn).     Exam showed normal external genitalia, no vaginal discharge,no adenexal ttp, cerivcal os closed    Ext: no peripheral edema  Skin: warm, dry, well perfused, no rashes/bruising/lesions on exposed skin  Neuro: alert, MAEE, no gross motor or sensory deficits, gait stable  Psych: Normal mood, normal affect      Emergency Department Course     Imaging:  Radiology findings were communicated with the patient who voiced understanding of the findings.    US OB < 14 weeks single:   1.  Single living intrauterine gestation at 12 weeks 4 days, EDC  10/8/2020. EDC based on the first ultrasound is 10/11/2020.  2.  Small subchorionic hemorrhage.  As per radiology.    Laboratory:  Laboratory findings were communicated with the patient who voiced understanding of the findings.    CBC: WBC: 6.4, HGB: 12.9, PLT: 317    HCG Quantitative Pregnancy blood: 56,270 (H)    UA with micro: mucous present, o/w negative    Interventions:  1044  Acetaminophen 650 mg, PO    Emergency Department Course:  Past medical records, nursing notes, and vitals reviewed.    1032 I performed an exam of the patient as documented above.     IV was inserted and blood was drawn for laboratory testing, results above.    The patient provided a urine sample here in the emergency department. This was sent for laboratory testing, findings above.    The patient was sent for a US OB while in the emergency department, results above.     1220 I spoke with Dr. Hung, OB/Gyn, regarding the patient.    1225 I rechecked the patient and discussed the results of her workup thus far.     Findings and plan explained to the Patient. Patient discharged home with instructions regarding supportive care, medications, and reasons to return. The importance of close follow-up was reviewed.     I personally reviewed the laboratory and imaging results  with the Patient and answered all related questions prior to discharge.     Impression & Plan     Medical Decision Making:  Alexsandra Westbrook, a 29 year old female ( A2) currently 12 weeks pregnant, here for abdominal pain and vaginal bleeding. Is known to be Rh+ and so no indication for Rhogam. Reports having an ultrasound this pregnancy and having a single living IUP. Very unlikely she has a heterotopic pregnancy. Will order a CBC, HCG quant, repeat ultrasound, and urinalysis. Low suspicion for non-OB related cause of symptoms such as bowel obstruction, perforation, appendicitis, diverticulitis, or kidney stones.     Ultrasound showing living IUP with small subchorionic hemorrhage. Hemoglobin is normal, no indication for transfusion. Discussed with OB, can follow up by phone later this week or return if symptoms worsen. She was comfortable with this plan.     Diagnosis:    ICD-10-CM   1. Vaginal bleeding in pregnancy, first trimester  O20.9   2. Pelvic pain affecting pregnancy in first trimester, antepartum  O26.891    R10.2       Disposition:  Discharged to home.    Scribe Disclosure:  Rodney PIPER, am serving as a scribe at 10:26 AM on 3/30/2020 to document services personally performed by Charly Dillard, * based on my observations and the provider's statements to me.      Charly Dillard MD  20

## 2020-03-30 NOTE — ED AVS SNAPSHOT
[facility] Emergency Department  [address]  [facility] [address]  Phone:  [phone]  Fax:  [phone]                                    Alexsandra Westbrook   MRN: [id number]    Department:  [facility] Emergency Department   Date of Visit:  3/30/2020           After Visit Summary Signature Page    I have received my discharge instructions, and my questions have been answered. I have discussed any challenges I see with this plan with the nurse or doctor.    ..........................................................................................................................................  Patient/Patient Representative Signature      ..........................................................................................................................................  Patient Representative Print Name and Relationship to Patient    ..................................................               ................................................  Date                                   Time    ..........................................................................................................................................  Reviewed by Signature/Title    ...................................................              ..............................................  Date                                               Time          22EPIC Rev 08/18

## 2020-03-31 ENCOUNTER — VIRTUAL VISIT (OUTPATIENT)
Dept: OBGYN | Facility: CLINIC | Age: 30
End: 2020-03-31
Payer: COMMERCIAL

## 2020-03-31 ENCOUNTER — APPOINTMENT (OUTPATIENT)
Dept: INTERPRETER SERVICES | Facility: CLINIC | Age: 30
End: 2020-03-31
Payer: COMMERCIAL

## 2020-03-31 DIAGNOSIS — Z71.89 OTHER SPECIFIED COUNSELING: Primary | Chronic | ICD-10-CM

## 2020-03-31 DIAGNOSIS — E55.9 VITAMIN D DEFICIENCY: ICD-10-CM

## 2020-03-31 DIAGNOSIS — O20.9 BLEEDING IN EARLY PREGNANCY: Primary | ICD-10-CM

## 2020-03-31 PROCEDURE — 99441 ZZC PHYSICIAN TELEPHONE EVALUATION 5-10 MIN: CPT | Performed by: OBSTETRICS & GYNECOLOGY

## 2020-03-31 NOTE — PATIENT INSTRUCTIONS
The best way to prevent an infection is to avoid being exposed to the virus. We recommend that you wash your hands frequently and avoid touching your eyes, nose or mouth.  Practice social distancing by staying home as much as possible, avoiding gatherings and minimize trips for essentials. You should especially avoid any contact with people who are sick. It is possible that people who have the virus have little or no symptoms which is why social distancing is so important to avoid spreading the virus. Clean frequently touched surfaces daily. If you do start to have symptoms such as cough, fever or mild shortness of breath, you should stay home for at least 14 days.  If your symptoms are worrisome or severe or you are scheduled during this time to have a clinic visit you should call us at [phone].    Due to anticipated shortage of blood products we recommend all pregnant women take an iron supplement (ferrous gluconate or ferrous sulfate) in addition to a prenatal vitamin.     The hospital has strict visitor restrictions at this time for your safety. You are allowed to have one healthy adult visitor during your hospital stay and it must be the same person the entire time.     For information about Covid-19 and pregnancy we look to the center for disease control, the CDC. You can look at this information online at:   https://www.cdc.gov/coronavirus/2019-ncov/hcp/pregnant-women-faq.html

## 2020-03-31 NOTE — PROGRESS NOTES
"Alexsandra Westbrook is a 29 year old female who is being evaluated via a billable telephone visit.      The patient has been notified of following:     \"This telephone visit will be conducted via a call between you and your physician/provider. We have found that certain health care needs can be provided without the need for a physical exam.  This service lets us provide the care you need with a short phone conversation.  If a prescription is necessary we can send it directly to your pharmacy.  If lab work is needed we can place an order for that and you can then stop by our lab to have the test done at a later time.    If during the course of the call the physician/provider feels a telephone visit is not appropriate, you will not be charged for this service.\"     Patient has given verbal consent for Telephone visit?  Yes    Alexsandra Westbrook complains of    Chief Complaint   Patient presents with     Prenatal Care     12+2.     Ultrasound     Patient was interviewed over the phone with an interpretor.     Alexsandra Westbrook is a  at 12w2d who is doing a telephone visit today to follow up on her bleeding in early pregnancy.  She was seen in the ED yesterday for bleeding in pregnancy and an OB US was done which showed a small subchorionic hemorrhage.   Reports that today there is still a slight amount of blood when she wipes after voiding but nothing on her pad.    Has been feeling very slight pain.   Taking her prenatal vitamins.   No nausea or vomiting now and denies any cold symptoms at this time.     ALLERGIES  Patient has no known allergies.      US OB < 14 WEEKS SINGLE 3/30/2020 11:40 AM     CLINICAL HISTORY: 12 wks, lower abd pain and bleeding  TECHNIQUE: Transabdominal scans were performed.      COMPARISON: 2020     FINDINGS:  UTERUS: Single normal appearing intrauterine gestation sac.  CRL: measures 60 mm, equals 12 weeks 4 days.  FETAL HEART RATE: 156 bpm.  AMNIOTIC FLUID: Normal.  PLACENTA: Not fully formed. There is a small " subchorionic hemorrhage  measuring 3 x 1 x 0.9 cm.     RIGHT OVARY: Normal.  LEFT OVARY: Normal.                                                                      IMPRESSION:   1.  Single living intrauterine gestation at 12 weeks 4 days, EDC  10/8/2020. EDC based on the first ultrasound is 10/11/2020.  2.  Small subchorionic hemorrhage.      Assessment/Plan:  1. Bleeding in early pregnancy  subchorionic hemorrhage explained what that is and normal expectations,and activity restrictions.  Patient instructed to call with any increase in bleeding.     2. Subchorionic hemorrhage of placenta in first trimester, single or unspecified fetus  discussed follow up and will have patient get set up for NOB visit in the clinic for 1-2 wks from now.     3. Vitamin D deficiency  Reviewed her recent  lab work from yesterday and noted that vitamin D level is borderline low.    - cholecalciferol (VITAMIN D3) 5000 units (125 mcg) capsule; Take 1 capsule (5,000 Units) by mouth daily  Dispense: 100 capsule; Refill: 5    Phone call duration: 15 minutes    Alondra Angeles MD

## 2020-04-01 ENCOUNTER — PATIENT OUTREACH (OUTPATIENT)
Dept: CARE COORDINATION | Facility: CLINIC | Age: 30
End: 2020-04-01

## 2020-04-01 NOTE — LETTER
[place] CARE COORDINATION  [facility]   April 2, 2020    Alexsandra Westbrook  [address] [facility] [address]      Dear Alexsandra,    I am a clinic care coordinator who works with Tonia Warren PA-C at [facility] . I have been trying to reach you recently to introduce Clinic Care Coordination and to see if there was anything I could assist you with.  Below is a description of clinic care coordination and how I can further assist you.      The clinic care coordinator team is made up of a registered nurse,  and community health worker who understand the health care system. The goal of clinic care coordination is to help you manage your health and improve access to the health care system in the most efficient manner. The team can assist you in meeting your health care goals by providing education, coordinating services, strengthening the communication among your providers  and supporting you with any resource needs.    Please feel free to contact me, at [phone] with any questions or concerns. We are focused on providing you with the highest-quality healthcare experience possible and that all starts with you.     Sincerely,     SABINO Chapman   Care Coordination Team  [phone]

## 2020-04-01 NOTE — PROGRESS NOTES
Clinic Care Coordination Contact  [facility]/Voicemail    Referral Source: ED Follow-Up    Clinical Data:   In ED on 3/30/20 for vaginal bleeding in pregnancy. Had OBGYN virtual Visit on 3/31/20.   Care Coordinator Outreach  Outreach attempted x 1.  Left message on patient's voicemail with call back information and requested return call.  Plan: Care Coordinator will send care coordination introduction letter with care coordinator contact information and explanation of care coordination services via [employer]. Care Coordinator will try to reach patient again in 1-2 business days.    SABINO Chapman   Care Coordination Team  [phone]

## 2020-04-02 NOTE — PROGRESS NOTES
Clinic Care Coordination Contact  [facility]    Referral Source: ED Follow-Up  Clinical Data: Care Coordinator Outreach  Outreach attempted x 2.  Unable to leave message on either line.  Plan: Care Coordinator sent care coordination introduction letter on 4/2/20 via [employer]. Care Coordinator will make no further outreach attempts at this time.      SABINO Chapman   Care Coordination Team  [phone]

## 2020-04-08 ENCOUNTER — HOSPITAL ENCOUNTER (OUTPATIENT)
Dept: LAB | Facility: CLINIC | Age: 30
Discharge: HOME | End: 2020-04-08
Attending: OBSTETRICS & GYNECOLOGY | Admitting: OBSTETRICS & GYNECOLOGY
Payer: COMMERCIAL

## 2020-04-08 DIAGNOSIS — O26.90 PREGNANCY RELATED CONDITION, ANTEPARTUM: ICD-10-CM

## 2020-04-08 DIAGNOSIS — Z36.9 ENCOUNTER FOR ANTENATAL SCREENING OF MOTHER: ICD-10-CM

## 2020-04-08 PROCEDURE — 36415 COLL VENOUS BLD VENIPUNCTURE: CPT | Performed by: OBSTETRICS & GYNECOLOGY

## 2020-04-08 PROCEDURE — 40000791 ZZHCL STATISTIC VERIFI PRENATAL TRISOMY 21,18,13: Performed by: OBSTETRICS & GYNECOLOGY

## 2020-04-10 ENCOUNTER — APPOINTMENT (OUTPATIENT)
Dept: INTERPRETER SERVICES | Facility: CLINIC | Age: 30
End: 2020-04-10
Payer: COMMERCIAL

## 2020-04-10 ENCOUNTER — PRENATAL OFFICE VISIT (OUTPATIENT)
Dept: OBGYN | Facility: CLINIC | Age: 30
End: 2020-04-10
Payer: COMMERCIAL

## 2020-04-10 VITALS
SYSTOLIC BLOOD PRESSURE: 104 MMHG | HEIGHT: 62 IN | BODY MASS INDEX: 23.35 KG/M2 | OXYGEN SATURATION: 97 % | TEMPERATURE: 98.4 F | DIASTOLIC BLOOD PRESSURE: 70 MMHG | WEIGHT: 126.9 LBS | HEART RATE: 89 BPM

## 2020-04-10 DIAGNOSIS — R79.89 ABNORMAL TSH: ICD-10-CM

## 2020-04-10 DIAGNOSIS — Z34.81 SUPERVISION OF NORMAL INTRAUTERINE PREGNANCY IN MULTIGRAVIDA IN FIRST TRIMESTER: Primary | ICD-10-CM

## 2020-04-10 LAB
T4 FREE SERPL-MCNC: 1.27 NG/DL (ref 0.76–1.46)
TSH SERPL DL<=0.005 MIU/L-ACNC: <0.01 MU/L (ref 0.4–4)

## 2020-04-10 PROCEDURE — 84439 ASSAY OF FREE THYROXINE: CPT | Performed by: OBSTETRICS & GYNECOLOGY

## 2020-04-10 PROCEDURE — 84443 ASSAY THYROID STIM HORMONE: CPT | Performed by: OBSTETRICS & GYNECOLOGY

## 2020-04-10 PROCEDURE — 99207 ZZC FIRST OB VISIT: CPT | Performed by: OBSTETRICS & GYNECOLOGY

## 2020-04-10 PROCEDURE — 36415 COLL VENOUS BLD VENIPUNCTURE: CPT | Performed by: OBSTETRICS & GYNECOLOGY

## 2020-04-10 RX ORDER — FERROUS SULFATE 325(65) MG
325 TABLET ORAL
Qty: 100 TABLET | Refills: 3 | Status: SHIPPED | OUTPATIENT
Start: 2020-04-10 | End: 2021-08-02

## 2020-04-10 RX ORDER — ACETAMINOPHEN 325 MG/1
325-650 TABLET ORAL EVERY 6 HOURS PRN
COMMUNITY
End: 2021-08-02

## 2020-04-10 ASSESSMENT — MIFFLIN-ST. JEOR: SCORE: 1249.89

## 2020-04-10 NOTE — PROGRESS NOTES
SUBJECTIVE: Alexsandra Westbrook is a 29 year old   here for initial OB visit.  Has had bleeding from subchorionic hemorrhage. Not bleeding any longer.   Dull abdominal pain, comes and goes. Three to four times a day. Always on left, lower quadrants. No fevers.   Bowel movements are regular.     Past Medical History:   Diagnosis Date     LSIL (low grade squamous intraepithelial lesion) on Pap smear 2014    age 23     PCOS (polycystic ovarian syndrome)      Vitamin D deficiency        Past Surgical History:   Procedure Laterality Date     DILATION AND CURETTAGE SUCTION N/A 2015    Procedure: DILATION AND CURETTAGE SUCTION;  Surgeon: Hafsa Myers MD;  Location: [facility] OR       Family History   Problem Relation Age of Onset     Thyroid Disease Mother         enlarged, hypothyroidism     Diabetes Maternal Grandmother      Cerebrovascular Disease Maternal Grandfather         old age       Social History     Socioeconomic History     Marital status: Single     Spouse name: Not on file     Number of children: Not on file     Years of education: Not on file     Highest education level: Not on file   Occupational History     Not on file   Social Needs     Financial resource strain: Not on file     Food insecurity     Worry: Not on file     Inability: Not on file     Transportation needs     Medical: Not on file     Non-medical: Not on file   Tobacco Use     Smoking status: Never Smoker     Smokeless tobacco: Never Used   Substance and Sexual Activity     Alcohol use: No     Drug use: No     Sexual activity: Yes     Partners: Male     Birth control/protection: None   Lifestyle     Physical activity     Days per week: Not on file     Minutes per session: Not on file     Stress: Not on file   Relationships     Social connections     Talks on phone: Not on file     Gets together: Not on file     Attends Confucianism service: Not on file     Active member of club or organization: Not on file     Attends meetings of clubs or  "organizations: Not on file     Relationship status: Not on file     Intimate partner violence     Fear of current or ex partner: Not on file     Emotionally abused: Not on file     Physically abused: Not on file     Forced sexual activity: Not on file   Other Topics Concern     Not on file   Social History Narrative    Caffeine intake/servings daily - 0    Calcium intake/servings daily - 3    Exercise 3 times weekly - describe treadmill    Sunscreen used - Yes    Seatbelts used - Yes    Guns stored in the home - No    Self Breast Exam - No    Pap test up to date -  Yes    Eye exam up to date -  Yes    Dental exam up to date -  Yes    DEXA scan up to date -  No    Flex Sig/Colonoscopy up to daCte -  No    Mammography up to date -  No    Immunizations reviewed and up to date - Yes    Abuse: Current or Past (Physical, Sexual or Emotional) - No    Do you feel safe in your environment - Yes    Do you cope well with stress - Yes    Do you suffer from insomnia - Yes    Last updated by: Dipika Calvo  3/20/2015             Current Outpatient Medications:      cholecalciferol (VITAMIN D3) 5000 units (125 mcg) capsule, Take 1 capsule (5,000 Units) by mouth daily, Disp: 100 capsule, Rfl: 5     Prenatal Vit-Fe Fumarate-FA (PRENATAL COMPLETE PO), , Disp: , Rfl:     No Known Allergies      Past Medical History of Father of Baby: No significant medical history    Review of Systems:   Constitutional, HEENT, cardiovascular, pulmonary, gi and gu systems are negative, except as otherwise noted.     History Since Last Menstrual Period: No Problems    EXAM:   Vitals:    04/10/20 1115   BP: 104/70   Pulse: 89   Temp: 98.4  F (36.9  C)   TempSrc: Oral   SpO2: 97%   Weight: 57.6 kg (126 lb 14.4 oz)   Height: 1.568 m (5' 1.75\")     Body mass index is 23.4 kg/m .  GENERAL APPEARANCE: healthy, alert and no distress  EYES: EOMI,  PERRL  HENT: Nose and mouth without ulcers or lesions  NECK: no adenopathy, no asymmetry, masses, or " scars and thyroid normal to palpation  RESP: lungs clear to auscultation - no rales, rhonchi or wheezes  BREAST: normal without masses, tenderness or nipple discharge and no palpable axillary masses or adenopathy  CV: regular rates and rhythm, normal S1 S2, no S3 or S4 and no murmur, click or rub -  ABDOMEN:  soft, nontender, no HSM or masses and bowel sounds normal  : normal cervix, adnexae, and uterus without masses or discharge  MS: extremities normal- no gross deformities noted, no evidence of inflammation in joints, FROM in all extremities.  SKIN: no suspicious lesions or rashes  NEURO: Normal strength and tone, sensory exam grossly normal, mentation intact and speech normal  PSYCH: mentation appears normal and affect normal/bright  LYMPHATICS: No axillary, cervical, inguinal, or supraclavicular nodes    Pelvix exam:  Deferred    Ultrasound: informal for heart tones. Viable romero IUP    ASSESSMENT/ PLAN:  Alexsandra Westbrook is a 29 year old   at 13 weeks 5 days by 7 week ultrasound inconsistent with LMP. EDC 10/11/2020  Follow up in 5 weeks. Fetal survey then.  Normal exercise.  Normal sexual activity.  Prenatal vitamins.  Anticipated weight gain:  BMI <25: 25-35 pounds  Discussed COVID pandemic. Recommend social distancing, shelter in place as much as possible, call clinic with concerns.   Recommended daily iron in addition to PNV to avoid anemia during COVID pandemic.  TDaP 27-36 weeks  Oriented to practice, PNC  Discussed aneuploidy screening, declines.  TSH today, slightly low with slightly high T4 at nurse teaching appointment.    Performed with telephone .

## 2020-04-17 ENCOUNTER — TELEPHONE (OUTPATIENT)
Dept: MATERNAL FETAL MEDICINE | Facility: CLINIC | Age: 30
End: 2020-04-17

## 2020-04-17 ENCOUNTER — APPOINTMENT (OUTPATIENT)
Dept: INTERPRETER SERVICES | Facility: CLINIC | Age: 30
End: 2020-04-17
Payer: COMMERCIAL

## 2020-04-17 LAB — LAB SCANNED RESULT: NORMAL

## 2020-04-17 NOTE — TELEPHONE ENCOUNTER
April 17, 2020    I spoke with Alexsandra regarding her NIPT results. [facility]  Son helped to facilitate today's phone call (ID: [id number]).     Results indicate NO ANEUPLOIDY DETECTED for chromosomes 21, 18, 13, or sex chromosomes (XX). The patient has been made aware of the predicted genetic sex of the pregnancy.     These results put the patient's current pregnancy at low risk for Down syndrome, trisomy 18, trisomy 13 and sex chromosome abnormalities.This test is reported to have the following sensitivities: Down syndrome- 99%, trisomy 18- 98%, and trisomy 13- 98%. Although these results are reassuring, this does not replace a standard chromosome analysis from a chorionic villus sampling or amniocentesis.     MSAFP is the appropriate second trimester screening test for open neural tube defects; the maternal quad screen is not recommended.    Her results are available in her Epic chart for her primary OB to review.       Yadi Rao MS, [facility]  Genetic Counselor  [facility]  Maternal Fetal Medicine  Ph: [phone]  [email or web address]

## 2020-05-11 ENCOUNTER — PRENATAL OFFICE VISIT (OUTPATIENT)
Dept: OBGYN | Facility: CLINIC | Age: 30
End: 2020-05-11
Attending: OBSTETRICS & GYNECOLOGY
Payer: COMMERCIAL

## 2020-05-11 ENCOUNTER — ANCILLARY PROCEDURE (OUTPATIENT)
Dept: ULTRASOUND IMAGING | Facility: CLINIC | Age: 30
End: 2020-05-11
Attending: OBSTETRICS & GYNECOLOGY
Payer: COMMERCIAL

## 2020-05-11 VITALS
HEART RATE: 91 BPM | TEMPERATURE: 98.5 F | BODY MASS INDEX: 24.6 KG/M2 | WEIGHT: 133.4 LBS | DIASTOLIC BLOOD PRESSURE: 68 MMHG | SYSTOLIC BLOOD PRESSURE: 99 MMHG

## 2020-05-11 DIAGNOSIS — O28.3 ABNORMAL FETAL ULTRASOUND: ICD-10-CM

## 2020-05-11 DIAGNOSIS — Z34.81 SUPERVISION OF NORMAL INTRAUTERINE PREGNANCY IN MULTIGRAVIDA IN FIRST TRIMESTER: Primary | ICD-10-CM

## 2020-05-11 DIAGNOSIS — E05.90 SUBCLINICAL HYPERTHYROIDISM: ICD-10-CM

## 2020-05-11 DIAGNOSIS — Z34.81 SUPERVISION OF NORMAL INTRAUTERINE PREGNANCY IN MULTIGRAVIDA IN FIRST TRIMESTER: ICD-10-CM

## 2020-05-11 PROCEDURE — 36415 COLL VENOUS BLD VENIPUNCTURE: CPT | Performed by: OBSTETRICS & GYNECOLOGY

## 2020-05-11 PROCEDURE — 76805 OB US >/= 14 WKS SNGL FETUS: CPT | Performed by: OBSTETRICS & GYNECOLOGY

## 2020-05-11 PROCEDURE — 84443 ASSAY THYROID STIM HORMONE: CPT | Performed by: OBSTETRICS & GYNECOLOGY

## 2020-05-11 PROCEDURE — 84439 ASSAY OF FREE THYROXINE: CPT | Performed by: OBSTETRICS & GYNECOLOGY

## 2020-05-11 PROCEDURE — 99207 ZZC PRENATAL VISIT: CPT | Performed by: OBSTETRICS & GYNECOLOGY

## 2020-05-11 NOTE — PROGRESS NOTES
18w1d  Doing well since last visit.  No particular questions or concerns.  Prelim FAS results show EIF.  Explained this finding, as well as the fact it's more common in women of  descent.  Patient reports having it last pregnancy.  Ordered [facility] US for further eval.  Will recheck thyroid labs due to abnormal tests in early pregnancy.  Plan for phone visit at 23-24w.  Leena Richmond MD

## 2020-05-12 ENCOUNTER — TRANSCRIBE ORDERS (OUTPATIENT)
Dept: MATERNAL FETAL MEDICINE | Facility: CLINIC | Age: 30
End: 2020-05-12

## 2020-05-12 DIAGNOSIS — O26.90 PREGNANCY RELATED CONDITION, ANTEPARTUM: Primary | ICD-10-CM

## 2020-05-12 LAB
T4 FREE SERPL-MCNC: 1.16 NG/DL (ref 0.76–1.46)
TSH SERPL DL<=0.005 MIU/L-ACNC: 0.04 MU/L (ref 0.4–4)

## 2020-05-13 ENCOUNTER — PRE VISIT (OUTPATIENT)
Dept: MATERNAL FETAL MEDICINE | Facility: CLINIC | Age: 30
End: 2020-05-13

## 2020-05-14 ENCOUNTER — OFFICE VISIT (OUTPATIENT)
Dept: MATERNAL FETAL MEDICINE | Facility: CLINIC | Age: 30
End: 2020-05-14
Attending: OBSTETRICS & GYNECOLOGY
Payer: COMMERCIAL

## 2020-05-14 ENCOUNTER — HOSPITAL ENCOUNTER (OUTPATIENT)
Dept: ULTRASOUND IMAGING | Facility: CLINIC | Age: 30
End: 2020-05-14
Attending: OBSTETRICS & GYNECOLOGY
Payer: COMMERCIAL

## 2020-05-14 DIAGNOSIS — Z03.73 SUSPECTED FETAL ANOMALY NOT FOUND: Primary | ICD-10-CM

## 2020-05-14 DIAGNOSIS — O26.90 PREGNANCY RELATED CONDITION, ANTEPARTUM: ICD-10-CM

## 2020-05-14 PROCEDURE — 76811 OB US DETAILED SNGL FETUS: CPT

## 2020-05-14 NOTE — PROGRESS NOTES
"Please see \"Imaging\" tab under Chart Review for full details.    Claudette Caldwell MD  Maternal Fetal Medicine    "

## 2020-06-15 ENCOUNTER — PRENATAL OFFICE VISIT (OUTPATIENT)
Dept: OBGYN | Facility: CLINIC | Age: 30
End: 2020-06-15
Payer: COMMERCIAL

## 2020-06-15 DIAGNOSIS — Z34.80 SUPERVISION OF OTHER NORMAL PREGNANCY, ANTEPARTUM: Primary | ICD-10-CM

## 2020-06-15 PROCEDURE — 99207 ZZC PRENATAL VISIT: CPT | Performed by: OBSTETRICS & GYNECOLOGY

## 2020-06-15 NOTE — PROGRESS NOTES
"Phone visit for modified prenatal care for COVID.  Call time 10 minutes.  Phone  assisted.  Fetus active. In past two weeks, feels like the baby is \"shaking\".  No contractions, pain, bleeding.    Office visit next, RTC 5 weeks, will doTdap and one hour glucose then.  AM    "

## 2020-07-21 ENCOUNTER — APPOINTMENT (OUTPATIENT)
Dept: INTERPRETER SERVICES | Facility: CLINIC | Age: 30
End: 2020-07-21
Payer: COMMERCIAL

## 2020-07-22 ENCOUNTER — PRENATAL OFFICE VISIT (OUTPATIENT)
Dept: OBGYN | Facility: CLINIC | Age: 30
End: 2020-07-22
Payer: COMMERCIAL

## 2020-07-22 VITALS
DIASTOLIC BLOOD PRESSURE: 71 MMHG | WEIGHT: 143 LBS | SYSTOLIC BLOOD PRESSURE: 111 MMHG | HEART RATE: 105 BPM | BODY MASS INDEX: 26.37 KG/M2 | OXYGEN SATURATION: 98 %

## 2020-07-22 DIAGNOSIS — Z23 NEED FOR TDAP VACCINATION: ICD-10-CM

## 2020-07-22 DIAGNOSIS — Z34.81 SUPERVISION OF NORMAL INTRAUTERINE PREGNANCY IN MULTIGRAVIDA IN FIRST TRIMESTER: ICD-10-CM

## 2020-07-22 DIAGNOSIS — E05.90 SUBCLINICAL HYPERTHYROIDISM: ICD-10-CM

## 2020-07-22 DIAGNOSIS — Z34.80 SUPERVISION OF OTHER NORMAL PREGNANCY, ANTEPARTUM: Primary | ICD-10-CM

## 2020-07-22 DIAGNOSIS — O99.810 ABNORMAL MATERNAL GLUCOSE TOLERANCE, ANTEPARTUM: Primary | ICD-10-CM

## 2020-07-22 LAB
GLUCOSE 1H P 50 G GLC PO SERPL-MCNC: 149 MG/DL (ref 60–129)
HGB BLD-MCNC: 12.1 G/DL (ref 11.7–15.7)
T4 FREE SERPL-MCNC: 1.01 NG/DL (ref 0.76–1.46)
TSH SERPL DL<=0.005 MIU/L-ACNC: 0.09 MU/L (ref 0.4–4)

## 2020-07-22 PROCEDURE — 00000218 ZZHCL STATISTIC OBHBG - HEMOGLOBIN: Performed by: OBSTETRICS & GYNECOLOGY

## 2020-07-22 PROCEDURE — 99207 ZZC PRENATAL VISIT: CPT | Performed by: OBSTETRICS & GYNECOLOGY

## 2020-07-22 PROCEDURE — 36415 COLL VENOUS BLD VENIPUNCTURE: CPT | Performed by: OBSTETRICS & GYNECOLOGY

## 2020-07-22 PROCEDURE — 84443 ASSAY THYROID STIM HORMONE: CPT | Performed by: OBSTETRICS & GYNECOLOGY

## 2020-07-22 PROCEDURE — 82950 GLUCOSE TEST: CPT | Performed by: OBSTETRICS & GYNECOLOGY

## 2020-07-22 PROCEDURE — 90715 TDAP VACCINE 7 YRS/> IM: CPT | Performed by: OBSTETRICS & GYNECOLOGY

## 2020-07-22 PROCEDURE — 84439 ASSAY OF FREE THYROXINE: CPT | Performed by: OBSTETRICS & GYNECOLOGY

## 2020-07-22 PROCEDURE — 90471 IMMUNIZATION ADMIN: CPT | Performed by: OBSTETRICS & GYNECOLOGY

## 2020-07-22 NOTE — PROGRESS NOTES
Doing well.   Good fetal movement.   Feels what she thinks are fetal hiccups.   More frequent contractions lately. Feels some contractions. Sometimes 3-4 and hour, mostly when resting.   GCT, hgb, TDaP today. Will check TSH as well.    Childbirth classes? NO  Plan on breastfeeding? Yes: will need a new pump. Got at hospital last time.   Birthcontrol? birth control patch, will start with POPs for a few months  Sex on ultrasound? girl  Circumsion? NA  Peds doc? 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 Discussed that CDC has now added pregnancy to list of high risk conditions, so we can take her off work. Discussed employer isn't obligated to hold her job or pay her unless short term disability available. She has been feeling safe at work. All clients and employees are masked. She wants to think about it.  RTC 4 weeks.

## 2020-07-27 DIAGNOSIS — O99.810 ABNORMAL MATERNAL GLUCOSE TOLERANCE, ANTEPARTUM: ICD-10-CM

## 2020-07-27 PROCEDURE — 82952 GTT-ADDED SAMPLES: CPT | Performed by: OBSTETRICS & GYNECOLOGY

## 2020-07-27 PROCEDURE — 82951 GLUCOSE TOLERANCE TEST (GTT): CPT | Performed by: OBSTETRICS & GYNECOLOGY

## 2020-07-27 PROCEDURE — 36415 COLL VENOUS BLD VENIPUNCTURE: CPT | Performed by: OBSTETRICS & GYNECOLOGY

## 2020-07-28 LAB
GLUCOSE 1H P 100 G GLC PO SERPL-MCNC: 168 MG/DL (ref 60–179)
GLUCOSE 2H P 100 G GLC PO SERPL-MCNC: 171 MG/DL (ref 60–154)
GLUCOSE 3H P 100 G GLC PO SERPL-MCNC: 104 MG/DL (ref 60–139)
GLUCOSE P FAST SERPL-MCNC: 86 MG/DL (ref 60–94)

## 2020-08-21 ENCOUNTER — PRENATAL OFFICE VISIT (OUTPATIENT)
Dept: OBGYN | Facility: CLINIC | Age: 30
End: 2020-08-21
Payer: COMMERCIAL

## 2020-08-21 VITALS
DIASTOLIC BLOOD PRESSURE: 71 MMHG | SYSTOLIC BLOOD PRESSURE: 112 MMHG | WEIGHT: 148 LBS | BODY MASS INDEX: 27.29 KG/M2 | HEART RATE: 110 BPM | TEMPERATURE: 98.4 F

## 2020-08-21 DIAGNOSIS — Z34.80 SUPERVISION OF OTHER NORMAL PREGNANCY, ANTEPARTUM: Primary | ICD-10-CM

## 2020-08-21 PROCEDURE — 99207 ZZC PRENATAL VISIT: CPT | Performed by: OBSTETRICS & GYNECOLOGY

## 2020-08-21 NOTE — PROGRESS NOTES
Doing well.   No concerns.   Contractions are stable.   Good fetal movement.   Weight gain on track.  RTC 2 weeks

## 2020-08-26 ENCOUNTER — OFFICE VISIT (OUTPATIENT)
Dept: PEDIATRICS | Facility: CLINIC | Age: 30
End: 2020-08-26
Payer: COMMERCIAL

## 2020-08-26 VITALS
WEIGHT: 152 LBS | HEART RATE: 120 BPM | OXYGEN SATURATION: 97 % | TEMPERATURE: 98.6 F | SYSTOLIC BLOOD PRESSURE: 112 MMHG | DIASTOLIC BLOOD PRESSURE: 58 MMHG | BODY MASS INDEX: 28.03 KG/M2

## 2020-08-26 DIAGNOSIS — Z33.1 PREGNANCY, INCIDENTAL: ICD-10-CM

## 2020-08-26 DIAGNOSIS — R59.1 LYMPHADENOPATHY: Primary | ICD-10-CM

## 2020-08-26 LAB
ALBUMIN SERPL-MCNC: 2.9 G/DL (ref 3.4–5)
ALP SERPL-CCNC: 55 U/L (ref 40–150)
ALT SERPL W P-5'-P-CCNC: 13 U/L (ref 0–50)
ANION GAP SERPL CALCULATED.3IONS-SCNC: 5 MMOL/L (ref 3–14)
AST SERPL W P-5'-P-CCNC: 23 U/L (ref 0–45)
BASOPHILS # BLD AUTO: 0 10E9/L (ref 0–0.2)
BASOPHILS NFR BLD AUTO: 0.4 %
BILIRUB SERPL-MCNC: 0.6 MG/DL (ref 0.2–1.3)
BUN SERPL-MCNC: 8 MG/DL (ref 7–30)
CALCIUM SERPL-MCNC: 9 MG/DL (ref 8.5–10.1)
CHLORIDE SERPL-SCNC: 106 MMOL/L (ref 94–109)
CO2 SERPL-SCNC: 23 MMOL/L (ref 20–32)
CREAT SERPL-MCNC: 0.5 MG/DL (ref 0.52–1.04)
DEPRECATED S PYO AG THROAT QL EIA: NEGATIVE
DIFFERENTIAL METHOD BLD: NORMAL
EOSINOPHIL # BLD AUTO: 0.1 10E9/L (ref 0–0.7)
EOSINOPHIL NFR BLD AUTO: 1 %
ERYTHROCYTE [DISTWIDTH] IN BLOOD BY AUTOMATED COUNT: 12.8 % (ref 10–15)
GFR SERPL CREATININE-BSD FRML MDRD: >90 ML/MIN/{1.73_M2}
GLUCOSE SERPL-MCNC: 100 MG/DL (ref 70–99)
HCT VFR BLD AUTO: 37.4 % (ref 35–47)
HETEROPH AB SER QL: NEGATIVE
HGB BLD-MCNC: 12.5 G/DL (ref 11.7–15.7)
LYMPHOCYTES # BLD AUTO: 1.9 10E9/L (ref 0.8–5.3)
LYMPHOCYTES NFR BLD AUTO: 27.4 %
MCH RBC QN AUTO: 31 PG (ref 26.5–33)
MCHC RBC AUTO-ENTMCNC: 33.4 G/DL (ref 31.5–36.5)
MCV RBC AUTO: 93 FL (ref 78–100)
MONOCYTES # BLD AUTO: 0.9 10E9/L (ref 0–1.3)
MONOCYTES NFR BLD AUTO: 12.4 %
NEUTROPHILS # BLD AUTO: 4.2 10E9/L (ref 1.6–8.3)
NEUTROPHILS NFR BLD AUTO: 58.8 %
PLATELET # BLD AUTO: 320 10E9/L (ref 150–450)
POTASSIUM SERPL-SCNC: 3.8 MMOL/L (ref 3.4–5.3)
PROT SERPL-MCNC: 7.1 G/DL (ref 6.8–8.8)
RBC # BLD AUTO: 4.03 10E12/L (ref 3.8–5.2)
SODIUM SERPL-SCNC: 134 MMOL/L (ref 133–144)
SPECIMEN SOURCE: NORMAL
SPECIMEN SOURCE: NORMAL
STREP GROUP A PCR: NOT DETECTED
WBC # BLD AUTO: 7.1 10E9/L (ref 4–11)

## 2020-08-26 PROCEDURE — 80053 COMPREHEN METABOLIC PANEL: CPT | Performed by: PHYSICIAN ASSISTANT

## 2020-08-26 PROCEDURE — 36415 COLL VENOUS BLD VENIPUNCTURE: CPT | Performed by: PHYSICIAN ASSISTANT

## 2020-08-26 PROCEDURE — 85025 COMPLETE CBC W/AUTO DIFF WBC: CPT | Performed by: PHYSICIAN ASSISTANT

## 2020-08-26 PROCEDURE — 40001204 ZZHCL STATISTIC STREP A RAPID: Performed by: PHYSICIAN ASSISTANT

## 2020-08-26 PROCEDURE — 86308 HETEROPHILE ANTIBODY SCREEN: CPT | Performed by: PHYSICIAN ASSISTANT

## 2020-08-26 PROCEDURE — 99214 OFFICE O/P EST MOD 30 MIN: CPT | Performed by: PHYSICIAN ASSISTANT

## 2020-08-26 PROCEDURE — 87651 STREP A DNA AMP PROBE: CPT | Performed by: PHYSICIAN ASSISTANT

## 2020-08-26 PROCEDURE — U0003 INFECTIOUS AGENT DETECTION BY NUCLEIC ACID (DNA OR RNA); SEVERE ACUTE RESPIRATORY SYNDROME CORONAVIRUS 2 (SARS-COV-2) (CORONAVIRUS DISEASE [COVID-19]), AMPLIFIED PROBE TECHNIQUE, MAKING USE OF HIGH THROUGHPUT TECHNOLOGIES AS DESCRIBED BY CMS-2020-01-R: HCPCS | Performed by: PHYSICIAN ASSISTANT

## 2020-08-26 NOTE — PROGRESS NOTES
Subjective     Alexsandra Westbrook is a 29 year old female, 33 weeks pregnant, who presents to clinic today for the following health issues:  Patient speaks Portuguese.  used via phone    HPI   Mass  Onset/Duration: 1 week on the left neck; yesterday left armpit  Description  Location: under left armpit and left side of the neck   Character: round, raised, painful  Itching: mild  Intensity:  mild  Progression of Symptoms:  same  Accompanying signs and symptoms:   Fever: no  Body aches or joint pain: YES  Fatigued   Sore throat symptoms: YES--am mild   No cough, wheezing, sob   Itchy ears  Rhinorrhea: no  Recent cold symptoms: no  History:           Previous episodes of similar mass: None  New exposures:  None  Recent travel: no  Precipitating or alleviating factors: nothing   Therapies tried and outcome: none    Sweating in third trimester.     Review of Systems   Constitutional, HEENT, cardiovascular, pulmonary, gi and gu systems are negative, except as otherwise noted.      Objective    /58 (BP Location: Right arm, Cuff Size: Adult Regular)   Pulse 120   Temp 98.6  F (37  C) (Tympanic)   Wt 68.9 kg (152 lb)   LMP 12/30/2019   SpO2 97%   BMI 28.03 kg/m    Body mass index is 28.03 kg/m .  Physical Exam   GENERAL: alert and no distress  EYES: Eyes grossly normal to inspection, PERRL and conjunctivae and sclerae normal  HENT: ear canals and TM's normal, nose and mouth without ulcers or lesions  NECK: left posterior LAD  Axilla: left axillary LN  RESP: lungs clear to auscultation - no rales, rhonchi or wheezes  CV: regular rate and rhythm, normal S1 S2, no S3 or S4    Results for orders placed or performed in visit on 08/26/20 (from the past 24 hour(s))   Streptococcus A Rapid Scr w Reflx to PCR    Specimen: Throat   Result Value Ref Range    Strep Specimen Description Throat     Streptococcus Group A Rapid Screen Negative NEG^Negative   Comprehensive metabolic panel   Result Value Ref Range    Sodium 134  133 - 144 mmol/L    Potassium 3.8 3.4 - 5.3 mmol/L    Chloride 106 94 - 109 mmol/L    Carbon Dioxide 23 20 - 32 mmol/L    Anion Gap 5 3 - 14 mmol/L    Glucose 100 (H) 70 - 99 mg/dL    Urea Nitrogen 8 7 - 30 mg/dL    Creatinine 0.50 (L) 0.52 - 1.04 mg/dL    GFR Estimate >90 >60 mL/min/[1.73_m2]    GFR Estimate If Black >90 >60 mL/min/[1.73_m2]    Calcium 9.0 8.5 - 10.1 mg/dL    Bilirubin Total 0.6 0.2 - 1.3 mg/dL    Albumin 2.9 (L) 3.4 - 5.0 g/dL    Protein Total 7.1 6.8 - 8.8 g/dL    Alkaline Phosphatase 55 40 - 150 U/L    ALT 13 0 - 50 U/L    AST 23 0 - 45 U/L   CBC with platelets differential   Result Value Ref Range    WBC 7.1 4.0 - 11.0 10e9/L    RBC Count 4.03 3.8 - 5.2 10e12/L    Hemoglobin 12.5 11.7 - 15.7 g/dL    Hematocrit 37.4 35.0 - 47.0 %    MCV 93 78 - 100 fl    MCH 31.0 26.5 - 33.0 pg    MCHC 33.4 31.5 - 36.5 g/dL    RDW 12.8 10.0 - 15.0 %    Platelet Count 320 150 - 450 10e9/L    % Neutrophils 58.8 %    % Lymphocytes 27.4 %    % Monocytes 12.4 %    % Eosinophils 1.0 %    % Basophils 0.4 %    Absolute Neutrophil 4.2 1.6 - 8.3 10e9/L    Absolute Lymphocytes 1.9 0.8 - 5.3 10e9/L    Absolute Monocytes 0.9 0.0 - 1.3 10e9/L    Absolute Eosinophils 0.1 0.0 - 0.7 10e9/L    Absolute Basophils 0.0 0.0 - 0.2 10e9/L    Diff Method Automated Method    Mononucleosis screen   Result Value Ref Range    Mononucleosis Screen Negative NEG^Negative           Assessment & Plan   (R59.1) Lymphadenopathy  (primary encounter diagnosis)  Comment: labs pending. If symptoms persist in one month, call for repeat labs and possible imaging.   Plan: Streptococcus A Rapid Scr w Reflx to PCR,         Symptomatic COVID-19 Virus (Coronavirus) by         PCR, Comprehensive metabolic panel, CBC with         platelets differential, Mononucleosis screen,         Group A Streptococcus PCR Throat Swab            (Z33.1) Pregnancy, incidental  Comment:   Plan:       Kriss Johnson PA-C  [facility]

## 2020-08-27 LAB
SARS-COV-2 RNA SPEC QL NAA+PROBE: NOT DETECTED
SPECIMEN SOURCE: NORMAL

## 2020-09-04 ENCOUNTER — PRENATAL OFFICE VISIT (OUTPATIENT)
Dept: OBGYN | Facility: CLINIC | Age: 30
End: 2020-09-04
Payer: COMMERCIAL

## 2020-09-04 VITALS
OXYGEN SATURATION: 95 % | BODY MASS INDEX: 27.84 KG/M2 | SYSTOLIC BLOOD PRESSURE: 104 MMHG | HEART RATE: 96 BPM | DIASTOLIC BLOOD PRESSURE: 68 MMHG | WEIGHT: 151 LBS

## 2020-09-04 DIAGNOSIS — Z34.80 SUPERVISION OF OTHER NORMAL PREGNANCY, ANTEPARTUM: Primary | ICD-10-CM

## 2020-09-04 PROCEDURE — 99207 ZZC PRENATAL VISIT: CPT | Performed by: OBSTETRICS & GYNECOLOGY

## 2020-09-04 NOTE — PROGRESS NOTES
34w5d feeling ok, getting more uncomfortable.  Lots of mvmt and sometimes when baby is really kicking she has a hard time taking a deep breath.  Discussed and reassured.   GBS next visit, will also check TSH with hgb.  RTC 2 weeks  jlp

## 2020-09-22 ENCOUNTER — PRENATAL OFFICE VISIT (OUTPATIENT)
Dept: OBGYN | Facility: CLINIC | Age: 30
End: 2020-09-22
Payer: COMMERCIAL

## 2020-09-22 VITALS
HEART RATE: 89 BPM | TEMPERATURE: 98.2 F | DIASTOLIC BLOOD PRESSURE: 78 MMHG | SYSTOLIC BLOOD PRESSURE: 114 MMHG | BODY MASS INDEX: 28.76 KG/M2 | WEIGHT: 156 LBS

## 2020-09-22 DIAGNOSIS — Z34.80 SUPERVISION OF OTHER NORMAL PREGNANCY, ANTEPARTUM: Primary | ICD-10-CM

## 2020-09-22 DIAGNOSIS — E05.90 SUBCLINICAL HYPERTHYROIDISM: ICD-10-CM

## 2020-09-22 LAB
HGB BLD-MCNC: 13.5 G/DL (ref 11.7–15.7)
T4 FREE SERPL-MCNC: 0.99 NG/DL (ref 0.76–1.46)
TSH SERPL DL<=0.005 MIU/L-ACNC: 0.24 MU/L (ref 0.4–4)

## 2020-09-22 PROCEDURE — 00000218 ZZHCL STATISTIC OBHBG - HEMOGLOBIN: Performed by: OBSTETRICS & GYNECOLOGY

## 2020-09-22 PROCEDURE — 84439 ASSAY OF FREE THYROXINE: CPT | Performed by: OBSTETRICS & GYNECOLOGY

## 2020-09-22 PROCEDURE — 36415 COLL VENOUS BLD VENIPUNCTURE: CPT | Performed by: OBSTETRICS & GYNECOLOGY

## 2020-09-22 PROCEDURE — 84443 ASSAY THYROID STIM HORMONE: CPT | Performed by: OBSTETRICS & GYNECOLOGY

## 2020-09-22 PROCEDURE — 87653 STREP B DNA AMP PROBE: CPT | Performed by: OBSTETRICS & GYNECOLOGY

## 2020-09-22 PROCEDURE — 99207 ZZC PRENATAL VISIT: CPT | Performed by: OBSTETRICS & GYNECOLOGY

## 2020-09-22 RX ORDER — ACETAMINOPHEN 325 MG/1
650 TABLET ORAL EVERY 6 HOURS PRN
Qty: 100 TABLET | Refills: 0 | Status: SHIPPED | OUTPATIENT
Start: 2020-09-22 | End: 2021-08-02

## 2020-09-22 RX ORDER — IBUPROFEN 600 MG/1
600 TABLET, FILM COATED ORAL EVERY 6 HOURS PRN
Qty: 60 TABLET | Refills: 0 | Status: SHIPPED | OUTPATIENT
Start: 2020-09-22 | End: 2021-08-02

## 2020-09-22 RX ORDER — AMOXICILLIN 250 MG
1 CAPSULE ORAL DAILY
Qty: 100 TABLET | Refills: 0 | Status: SHIPPED | OUTPATIENT
Start: 2020-09-22 | End: 2021-08-02

## 2020-09-22 NOTE — PROGRESS NOTES
37w2d  Active fetal movement. Daily contractions, about 4 times per hour, some take her breath away, and increased vaginal pressure. No leaking, bleeding or abnormal discharge.     BSUS: cephalic  SCE: 2/70/-2, soft, mid  GBS: collected today  Hemoglobin   Date Value Ref Range Status   08/26/2020 12.5 11.7 - 15.7 g/dL Final     Breast pump rx: Done  Labor orders: Not Done  Birth plan: flexible, no epidural with first  Length of stay: discussed   Disability paperwork: NA  Resident involvement: discussed and agrees.  OTC postpartum meds: rx today     Hgb and TSH, free T4 today.    Reviewed labor instructions, kick counts, s/sx pre-eclampsia.  Due to language barrier, a phone  was used during the history-taking, exam and subsequent discussion with this patient.    RTC weekly.  Leena Hung MD

## 2020-09-22 NOTE — PATIENT INSTRUCTIONS
The best way to prevent an infection is to avoid being exposed to the virus. We recommend that you wash your hands frequently and avoid touching your eyes, nose or mouth.  Practice social distancing by staying home as much as possible, avoiding gatherings and minimize trips for essentials. You should especially avoid any contact with people who are sick. It is possible that people who have the virus have little or no symptoms which is why social distancing is so important to avoid spreading the virus. Clean frequently touched surfaces daily. If you do start to have symptoms such as cough, fever or mild shortness of breath, we recommend you pursue testing [email or web address] and you should stay home for at least 14 days.  If your symptoms are worrisome or severe or you are scheduled during this time to have a clinic visit you should call us at [phone].    Due to anticipated shortage of blood products we recommend all pregnant women take an iron supplement (ferrous gluconate or ferrous sulfate) in addition to a prenatal vitamin.     The hospital has strict visitor restrictions at this time for your safety. Please be aware that visitor restrictions are subject to change. You are allowed to have one healthy adult visitor during your hospital stay, it must be the same person the entire time..     For information about Covid-19 and pregnancy we look to the center for disease control, the CDC. You can look at this information online at:   https://www.cdc.gov/coronavirus/2019-ncov/need-extra-precautions/pregnancy-breastfeeding.html

## 2020-09-23 LAB
GP B STREP DNA SPEC QL NAA+PROBE: NEGATIVE
SPECIMEN SOURCE: NORMAL

## 2020-09-28 ENCOUNTER — PRENATAL OFFICE VISIT (OUTPATIENT)
Dept: OBGYN | Facility: CLINIC | Age: 30
End: 2020-09-28
Payer: COMMERCIAL

## 2020-09-28 VITALS
WEIGHT: 159 LBS | DIASTOLIC BLOOD PRESSURE: 71 MMHG | TEMPERATURE: 98.8 F | HEART RATE: 94 BPM | BODY MASS INDEX: 29.32 KG/M2 | SYSTOLIC BLOOD PRESSURE: 106 MMHG

## 2020-09-28 DIAGNOSIS — Z23 NEED FOR PROPHYLACTIC VACCINATION AND INOCULATION AGAINST INFLUENZA: ICD-10-CM

## 2020-09-28 DIAGNOSIS — Z34.80 SUPERVISION OF OTHER NORMAL PREGNANCY, ANTEPARTUM: Primary | ICD-10-CM

## 2020-09-28 PROCEDURE — 59426 ANTEPARTUM CARE ONLY: CPT | Performed by: OBSTETRICS & GYNECOLOGY

## 2020-09-28 PROCEDURE — 99207 ZZC PRENATAL VISIT: CPT | Performed by: OBSTETRICS & GYNECOLOGY

## 2020-09-28 PROCEDURE — 90471 IMMUNIZATION ADMIN: CPT | Performed by: OBSTETRICS & GYNECOLOGY

## 2020-09-28 PROCEDURE — 90686 IIV4 VACC NO PRSV 0.5 ML IM: CPT | Performed by: OBSTETRICS & GYNECOLOGY

## 2020-09-28 RX ORDER — IBUPROFEN 200 MG
800 TABLET ORAL
Status: CANCELLED | OUTPATIENT
Start: 2020-09-28

## 2020-09-28 RX ORDER — ACETAMINOPHEN 325 MG/1
650 TABLET ORAL EVERY 4 HOURS PRN
Status: CANCELLED | OUTPATIENT
Start: 2020-09-28

## 2020-09-28 RX ORDER — OXYTOCIN 10 [USP'U]/ML
10 INJECTION, SOLUTION INTRAMUSCULAR; INTRAVENOUS
Status: CANCELLED | OUTPATIENT
Start: 2020-09-28

## 2020-09-28 RX ORDER — NALOXONE HYDROCHLORIDE 0.4 MG/ML
.1-.4 INJECTION, SOLUTION INTRAMUSCULAR; INTRAVENOUS; SUBCUTANEOUS
Status: CANCELLED | OUTPATIENT
Start: 2020-09-28

## 2020-09-28 RX ORDER — METHYLERGONOVINE MALEATE 0.2 MG/ML
200 INJECTION INTRAVENOUS
Status: CANCELLED | OUTPATIENT
Start: 2020-09-28

## 2020-09-28 RX ORDER — SODIUM CHLORIDE, SODIUM LACTATE, POTASSIUM CHLORIDE, CALCIUM CHLORIDE 600; 310; 30; 20 MG/100ML; MG/100ML; MG/100ML; MG/100ML
INJECTION, SOLUTION INTRAVENOUS CONTINUOUS
Status: CANCELLED | OUTPATIENT
Start: 2020-09-28

## 2020-09-28 RX ORDER — CARBOPROST TROMETHAMINE 250 UG/ML
250 INJECTION, SOLUTION INTRAMUSCULAR
Status: CANCELLED | OUTPATIENT
Start: 2020-09-28

## 2020-09-28 RX ORDER — OXYCODONE AND ACETAMINOPHEN 5; 325 MG/1; MG/1
1 TABLET ORAL
Status: CANCELLED | OUTPATIENT
Start: 2020-09-28

## 2020-09-28 RX ORDER — ONDANSETRON 2 MG/ML
4 INJECTION INTRAMUSCULAR; INTRAVENOUS EVERY 6 HOURS PRN
Status: CANCELLED | OUTPATIENT
Start: 2020-09-28

## 2020-09-28 RX ORDER — FENTANYL CITRATE 50 UG/ML
50-100 INJECTION, SOLUTION INTRAMUSCULAR; INTRAVENOUS
Status: CANCELLED | OUTPATIENT
Start: 2020-09-28

## 2020-09-28 RX ORDER — OXYTOCIN/0.9 % SODIUM CHLORIDE 30/500 ML
100-340 PLASTIC BAG, INJECTION (ML) INTRAVENOUS CONTINUOUS PRN
Status: CANCELLED | OUTPATIENT
Start: 2020-09-28

## 2020-09-28 RX ORDER — LIDOCAINE 40 MG/G
CREAM TOPICAL
Status: CANCELLED | OUTPATIENT
Start: 2020-09-28

## 2020-09-28 NOTE — PATIENT INSTRUCTIONS
The best way to prevent an infection is to avoid being exposed to the virus. We recommend that you wash your hands frequently and avoid touching your eyes, nose or mouth.  Practice social distancing by staying home as much as possible, avoiding gatherings and minimize trips for essentials. You should especially avoid any contact with people who are sick. It is possible that people who have the virus have little or no symptoms which is why social distancing is so important to avoid spreading the virus. Clean frequently touched surfaces daily. If you do start to have symptoms such as cough, fever or mild shortness of breath, we recommend you pursue testing [email or web address] and you should stay home for at least 14 days.  If your symptoms are worrisome or severe or you are scheduled during this time to have a clinic visit you should call us at [phone].    Due to anticipated shortage of blood products we recommend all pregnant women take an iron supplement (ferrous gluconate or ferrous sulfate) in addition to a prenatal vitamin.     The hospital has strict visitor restrictions at this time for your safety. Please be aware that visitor restrictions are subject to change. You are allowed to have one healthy adult visitor during your hospital stay, it must be the same person the entire time..     For information about Covid-19 and pregnancy we look to the center for disease control, the CDC. You can look at this information online at:   https://www.cdc.gov/coronavirus/2019-ncov/need-extra-precautions/pregnancy-breastfeeding.html

## 2020-09-28 NOTE — PROGRESS NOTES
38w1d  Active fetal movement. Occasional mild contractions. No leaking, bleeding or abnormal discharge. Mucus discharge over the weekend.     Feeling a little bit dizzy over the past week, gets better if she eats.   Questions about lump (lymph node on left side of neck behind ear and under left arm pit. They are smaller than before. Primary care told her she may need an ultrasound of them. Discussed okay to wait until postpartum especially since they have gotten smaller.     SCE: 3/70/-2/soft/mid    Labor orders: Done, signed and held  Flu shot today.  Reviewed labor instructions, kick counts, s/sx pre-eclampsia.  RTC weekly.  Leena Hung MD

## 2020-10-04 ENCOUNTER — HOSPITAL ENCOUNTER (INPATIENT)
Facility: CLINIC | Age: 30
LOS: 1 days | Discharge: HOME | End: 2020-10-05
Attending: OBSTETRICS & GYNECOLOGY | Admitting: OBSTETRICS & GYNECOLOGY
Payer: COMMERCIAL

## 2020-10-04 LAB
ABO + RH BLD: NORMAL
ABO + RH BLD: NORMAL
AMPHETAMINES UR QL SCN: NEGATIVE
BLD GP AB SCN SERPL QL: NORMAL
BLOOD BANK CMNT PATIENT-IMP: NORMAL
CANNABINOIDS UR QL: NEGATIVE
COCAINE UR QL: NEGATIVE
HGB BLD-MCNC: 13.7 G/DL (ref 11.7–15.7)
LABORATORY COMMENT REPORT: NORMAL
OPIATES UR QL SCN: NEGATIVE
PCP UR QL SCN: NEGATIVE
PLATELET # BLD AUTO: 288 10E9/L (ref 150–450)
SARS-COV-2 RNA SPEC QL NAA+PROBE: NEGATIVE
SARS-COV-2 RNA SPEC QL NAA+PROBE: NORMAL
SPECIMEN EXP DATE BLD: NORMAL
SPECIMEN SOURCE: NORMAL
SPECIMEN SOURCE: NORMAL
T PALLIDUM AB SER QL: NONREACTIVE

## 2020-10-04 PROCEDURE — 80307 DRUG TEST PRSMV CHEM ANLYZR: CPT | Performed by: OBSTETRICS & GYNECOLOGY

## 2020-10-04 PROCEDURE — 36415 COLL VENOUS BLD VENIPUNCTURE: CPT | Performed by: OBSTETRICS & GYNECOLOGY

## 2020-10-04 PROCEDURE — 85049 AUTOMATED PLATELET COUNT: CPT | Performed by: OBSTETRICS & GYNECOLOGY

## 2020-10-04 PROCEDURE — G0463 HOSPITAL OUTPT CLINIC VISIT: HCPCS

## 2020-10-04 PROCEDURE — 85018 HEMOGLOBIN: CPT | Performed by: OBSTETRICS & GYNECOLOGY

## 2020-10-04 PROCEDURE — 86900 BLOOD TYPING SEROLOGIC ABO: CPT | Performed by: OBSTETRICS & GYNECOLOGY

## 2020-10-04 PROCEDURE — 250N000013 HC RX MED GY IP 250 OP 250 PS 637: Performed by: OBSTETRICS & GYNECOLOGY

## 2020-10-04 PROCEDURE — U0003 INFECTIOUS AGENT DETECTION BY NUCLEIC ACID (DNA OR RNA); SEVERE ACUTE RESPIRATORY SYNDROME CORONAVIRUS 2 (SARS-COV-2) (CORONAVIRUS DISEASE [COVID-19]), AMPLIFIED PROBE TECHNIQUE, MAKING USE OF HIGH THROUGHPUT TECHNOLOGIES AS DESCRIBED BY CMS-2020-01-R: HCPCS | Performed by: OBSTETRICS & GYNECOLOGY

## 2020-10-04 PROCEDURE — 0KQM0ZZ REPAIR PERINEUM MUSCLE, OPEN APPROACH: ICD-10-PCS | Performed by: OBSTETRICS & GYNECOLOGY

## 2020-10-04 PROCEDURE — 250N000009 HC RX 250: Performed by: OBSTETRICS & GYNECOLOGY

## 2020-10-04 PROCEDURE — 86901 BLOOD TYPING SEROLOGIC RH(D): CPT | Performed by: OBSTETRICS & GYNECOLOGY

## 2020-10-04 PROCEDURE — 722N000001 HC LABOR CARE VAGINAL DELIVERY SINGLE

## 2020-10-04 PROCEDURE — 120N000001 HC R&B MED SURG/OB

## 2020-10-04 PROCEDURE — 86850 RBC ANTIBODY SCREEN: CPT | Performed by: OBSTETRICS & GYNECOLOGY

## 2020-10-04 PROCEDURE — 86780 TREPONEMA PALLIDUM: CPT | Performed by: OBSTETRICS & GYNECOLOGY

## 2020-10-04 RX ORDER — ACETAMINOPHEN 325 MG/1
650 TABLET ORAL EVERY 4 HOURS PRN
Status: DISCONTINUED | OUTPATIENT
Start: 2020-10-04 | End: 2020-10-05 | Stop reason: HOSPADM

## 2020-10-04 RX ORDER — TRANEXAMIC ACID 10 MG/ML
1 INJECTION, SOLUTION INTRAVENOUS EVERY 30 MIN PRN
Status: DISCONTINUED | OUTPATIENT
Start: 2020-10-04 | End: 2020-10-04

## 2020-10-04 RX ORDER — METHYLERGONOVINE MALEATE 0.2 MG/ML
200 INJECTION INTRAVENOUS
Status: DISCONTINUED | OUTPATIENT
Start: 2020-10-04 | End: 2020-10-05 | Stop reason: CLARIF

## 2020-10-04 RX ORDER — IBUPROFEN 800 MG/1
800 TABLET, FILM COATED ORAL EVERY 6 HOURS PRN
Status: DISCONTINUED | OUTPATIENT
Start: 2020-10-04 | End: 2020-10-05 | Stop reason: HOSPADM

## 2020-10-04 RX ORDER — OXYTOCIN/0.9 % SODIUM CHLORIDE 30/500 ML
100 PLASTIC BAG, INJECTION (ML) INTRAVENOUS CONTINUOUS
Status: DISCONTINUED | OUTPATIENT
Start: 2020-10-04 | End: 2020-10-05 | Stop reason: HOSPADM

## 2020-10-04 RX ORDER — SODIUM CHLORIDE, SODIUM LACTATE, POTASSIUM CHLORIDE, CALCIUM CHLORIDE 600; 310; 30; 20 MG/100ML; MG/100ML; MG/100ML; MG/100ML
INJECTION, SOLUTION INTRAVENOUS CONTINUOUS
Status: DISCONTINUED | OUTPATIENT
Start: 2020-10-04 | End: 2020-10-05 | Stop reason: CLARIF

## 2020-10-04 RX ORDER — TRANEXAMIC ACID 10 MG/ML
1 INJECTION, SOLUTION INTRAVENOUS EVERY 30 MIN PRN
Status: DISCONTINUED | OUTPATIENT
Start: 2020-10-04 | End: 2020-10-05 | Stop reason: CLARIF

## 2020-10-04 RX ORDER — OXYTOCIN 10 [USP'U]/ML
10 INJECTION, SOLUTION INTRAMUSCULAR; INTRAVENOUS
Status: DISCONTINUED | OUTPATIENT
Start: 2020-10-04 | End: 2020-10-05 | Stop reason: HOSPADM

## 2020-10-04 RX ORDER — OXYTOCIN/0.9 % SODIUM CHLORIDE 30/500 ML
340 PLASTIC BAG, INJECTION (ML) INTRAVENOUS CONTINUOUS PRN
Status: DISCONTINUED | OUTPATIENT
Start: 2020-10-04 | End: 2020-10-05 | Stop reason: HOSPADM

## 2020-10-04 RX ORDER — OXYTOCIN/0.9 % SODIUM CHLORIDE 30/500 ML
100-340 PLASTIC BAG, INJECTION (ML) INTRAVENOUS CONTINUOUS PRN
Status: DISCONTINUED | OUTPATIENT
Start: 2020-10-04 | End: 2020-10-05 | Stop reason: CLARIF

## 2020-10-04 RX ORDER — CARBOPROST TROMETHAMINE 250 UG/ML
250 INJECTION, SOLUTION INTRAMUSCULAR
Status: DISCONTINUED | OUTPATIENT
Start: 2020-10-04 | End: 2020-10-05 | Stop reason: CLARIF

## 2020-10-04 RX ORDER — FENTANYL CITRATE 50 UG/ML
50-100 INJECTION, SOLUTION INTRAMUSCULAR; INTRAVENOUS
Status: DISCONTINUED | OUTPATIENT
Start: 2020-10-04 | End: 2020-10-04

## 2020-10-04 RX ORDER — OXYCODONE AND ACETAMINOPHEN 5; 325 MG/1; MG/1
1 TABLET ORAL
Status: DISCONTINUED | OUTPATIENT
Start: 2020-10-04 | End: 2020-10-04

## 2020-10-04 RX ORDER — OXYTOCIN 10 [USP'U]/ML
10 INJECTION, SOLUTION INTRAMUSCULAR; INTRAVENOUS
Status: DISCONTINUED | OUTPATIENT
Start: 2020-10-04 | End: 2020-10-04

## 2020-10-04 RX ORDER — CARBOPROST TROMETHAMINE 250 UG/ML
250 INJECTION, SOLUTION INTRAMUSCULAR
Status: DISCONTINUED | OUTPATIENT
Start: 2020-10-04 | End: 2020-10-04

## 2020-10-04 RX ORDER — ONDANSETRON 2 MG/ML
4 INJECTION INTRAMUSCULAR; INTRAVENOUS EVERY 6 HOURS PRN
Status: DISCONTINUED | OUTPATIENT
Start: 2020-10-04 | End: 2020-10-05 | Stop reason: CLARIF

## 2020-10-04 RX ORDER — AMOXICILLIN 250 MG
1 CAPSULE ORAL 2 TIMES DAILY
Status: DISCONTINUED | OUTPATIENT
Start: 2020-10-04 | End: 2020-10-05 | Stop reason: HOSPADM

## 2020-10-04 RX ORDER — HYDROCORTISONE 2.5 %
CREAM (GRAM) TOPICAL 3 TIMES DAILY PRN
Status: DISCONTINUED | OUTPATIENT
Start: 2020-10-04 | End: 2020-10-05 | Stop reason: HOSPADM

## 2020-10-04 RX ORDER — BISACODYL 10 MG
10 SUPPOSITORY, RECTAL RECTAL DAILY PRN
Status: DISCONTINUED | OUTPATIENT
Start: 2020-10-06 | End: 2020-10-05 | Stop reason: HOSPADM

## 2020-10-04 RX ORDER — LIDOCAINE 40 MG/G
CREAM TOPICAL
Status: DISCONTINUED | OUTPATIENT
Start: 2020-10-04 | End: 2020-10-05 | Stop reason: CLARIF

## 2020-10-04 RX ORDER — OXYTOCIN 10 [USP'U]/ML
10 INJECTION, SOLUTION INTRAMUSCULAR; INTRAVENOUS
Status: DISCONTINUED | OUTPATIENT
Start: 2020-10-04 | End: 2020-10-05 | Stop reason: CLARIF

## 2020-10-04 RX ORDER — NALOXONE HYDROCHLORIDE 0.4 MG/ML
.1-.4 INJECTION, SOLUTION INTRAMUSCULAR; INTRAVENOUS; SUBCUTANEOUS
Status: DISCONTINUED | OUTPATIENT
Start: 2020-10-04 | End: 2020-10-04

## 2020-10-04 RX ORDER — OXYCODONE AND ACETAMINOPHEN 5; 325 MG/1; MG/1
1 TABLET ORAL
Status: DISCONTINUED | OUTPATIENT
Start: 2020-10-04 | End: 2020-10-05 | Stop reason: CLARIF

## 2020-10-04 RX ORDER — NALOXONE HYDROCHLORIDE 0.4 MG/ML
.1-.4 INJECTION, SOLUTION INTRAMUSCULAR; INTRAVENOUS; SUBCUTANEOUS
Status: DISCONTINUED | OUTPATIENT
Start: 2020-10-04 | End: 2020-10-05 | Stop reason: CLARIF

## 2020-10-04 RX ORDER — METHYLERGONOVINE MALEATE 0.2 MG/ML
200 INJECTION INTRAVENOUS
Status: DISCONTINUED | OUTPATIENT
Start: 2020-10-04 | End: 2020-10-04

## 2020-10-04 RX ORDER — MODIFIED LANOLIN
OINTMENT (GRAM) TOPICAL
Status: DISCONTINUED | OUTPATIENT
Start: 2020-10-04 | End: 2020-10-05 | Stop reason: HOSPADM

## 2020-10-04 RX ORDER — ACETAMINOPHEN 325 MG/1
650 TABLET ORAL EVERY 4 HOURS PRN
Status: DISCONTINUED | OUTPATIENT
Start: 2020-10-04 | End: 2020-10-05 | Stop reason: CLARIF

## 2020-10-04 RX ORDER — FENTANYL CITRATE 50 UG/ML
50-100 INJECTION, SOLUTION INTRAMUSCULAR; INTRAVENOUS
Status: DISCONTINUED | OUTPATIENT
Start: 2020-10-04 | End: 2020-10-05 | Stop reason: CLARIF

## 2020-10-04 RX ORDER — NALOXONE HYDROCHLORIDE 0.4 MG/ML
.1-.4 INJECTION, SOLUTION INTRAMUSCULAR; INTRAVENOUS; SUBCUTANEOUS
Status: DISCONTINUED | OUTPATIENT
Start: 2020-10-04 | End: 2020-10-05 | Stop reason: HOSPADM

## 2020-10-04 RX ORDER — OXYTOCIN/0.9 % SODIUM CHLORIDE 30/500 ML
100-340 PLASTIC BAG, INJECTION (ML) INTRAVENOUS CONTINUOUS PRN
Status: COMPLETED | OUTPATIENT
Start: 2020-10-04 | End: 2020-10-04

## 2020-10-04 RX ORDER — ACETAMINOPHEN 325 MG/1
650 TABLET ORAL EVERY 4 HOURS PRN
Status: DISCONTINUED | OUTPATIENT
Start: 2020-10-04 | End: 2020-10-04

## 2020-10-04 RX ORDER — TRANEXAMIC ACID 10 MG/ML
1 INJECTION, SOLUTION INTRAVENOUS EVERY 30 MIN PRN
Status: DISCONTINUED | OUTPATIENT
Start: 2020-10-04 | End: 2020-10-05 | Stop reason: HOSPADM

## 2020-10-04 RX ORDER — SODIUM CHLORIDE, SODIUM LACTATE, POTASSIUM CHLORIDE, CALCIUM CHLORIDE 600; 310; 30; 20 MG/100ML; MG/100ML; MG/100ML; MG/100ML
INJECTION, SOLUTION INTRAVENOUS CONTINUOUS
Status: DISCONTINUED | OUTPATIENT
Start: 2020-10-04 | End: 2020-10-04

## 2020-10-04 RX ORDER — AMOXICILLIN 250 MG
2 CAPSULE ORAL 2 TIMES DAILY
Status: DISCONTINUED | OUTPATIENT
Start: 2020-10-04 | End: 2020-10-05 | Stop reason: HOSPADM

## 2020-10-04 RX ORDER — IBUPROFEN 800 MG/1
800 TABLET, FILM COATED ORAL
Status: DISCONTINUED | OUTPATIENT
Start: 2020-10-04 | End: 2020-10-05 | Stop reason: CLARIF

## 2020-10-04 RX ORDER — IBUPROFEN 800 MG/1
800 TABLET, FILM COATED ORAL
Status: DISCONTINUED | OUTPATIENT
Start: 2020-10-04 | End: 2020-10-04

## 2020-10-04 RX ORDER — ONDANSETRON 2 MG/ML
4 INJECTION INTRAMUSCULAR; INTRAVENOUS EVERY 6 HOURS PRN
Status: DISCONTINUED | OUTPATIENT
Start: 2020-10-04 | End: 2020-10-04

## 2020-10-04 RX ADMIN — ACETAMINOPHEN 650 MG: 325 TABLET, FILM COATED ORAL at 16:59

## 2020-10-04 RX ADMIN — IBUPROFEN 800 MG: 800 TABLET ORAL at 06:30

## 2020-10-04 RX ADMIN — IBUPROFEN 800 MG: 800 TABLET ORAL at 18:47

## 2020-10-04 RX ADMIN — IBUPROFEN 800 MG: 800 TABLET ORAL at 12:44

## 2020-10-04 RX ADMIN — Medication 340 ML/HR: at 05:33

## 2020-10-04 RX ADMIN — DOCUSATE SODIUM AND SENNOSIDES 1 TABLET: 8.6; 5 TABLET ORAL at 10:46

## 2020-10-04 RX ADMIN — LIDOCAINE HYDROCHLORIDE 20 ML: 10 INJECTION, SOLUTION EPIDURAL; INFILTRATION; INTRACAUDAL; PERINEURAL at 05:35

## 2020-10-04 RX ADMIN — DOCUSATE SODIUM AND SENNOSIDES 1 TABLET: 8.6; 5 TABLET ORAL at 21:47

## 2020-10-04 RX ADMIN — ACETAMINOPHEN 650 MG: 325 TABLET, FILM COATED ORAL at 12:44

## 2020-10-04 RX ADMIN — ACETAMINOPHEN 650 MG: 325 TABLET, FILM COATED ORAL at 21:46

## 2020-10-04 NOTE — L&D DELIVERY NOTE
Delivery Date:  10/04/2020      ANTEPARTUM DIAGNOSES:  Intrauterine pregnancy, 39 weeks' gestation, prenatal care elsewhere ([facility]), spontaneous active labor and precipitous delivery, group B strep negative.      POSTPARTUM DIAGNOSES:  Status post normal spontaneous vaginal delivery, infant female, weight pending, Apgars 9 and 9.  Second-degree perineal laceration, repaired.  Spontaneous placental passage, intact.      PATIENT IDENTIFICATION:  Alexsandra Westbrook is a 29-year-old  female,  4, para 2-0-2-2, at 39 weeks' gestation who presented to [facility] in advanced active labor and for imminent delivery.  The patient's prenatal care was elsewhere and was planned to deliver at [facility].  Her first prenatal visit elsewhere was at 7-1/7 weeks.  She did have regular prenatal care and a weight gain of 33 pounds.  Her current pregnancy was complicated by her history of PCOS, a history of a low-grade squamous intraepithelial cervical lesion, vitamin D deficiency, and low TSH during the current pregnancy with either a normal or slightly elevated free T4.  The patient's blood type is A positive, antibody screen was negative, rubella titer showed immunity.  Hepatitis B, HIV, RPR were all negative.  Chlamydia and gonorrhea DNA probes were negative.  Three-hour GTT showed 1 abnormal value, suggesting no gestational diabetes mellitus.  A group B strep culture at term was negative.  An NIPT test was normal in the first trimester.  An early ultrasound confirmed the due date of 10/11/2020.  Her 20-week ultrasound was normal other than an isolated echogenic intraventricular focus.  As is noted above, the NIPT was normal.  There was no evidence of placenta previa and the fluid volume was normal.  The patient's previous obstetrical history is significant for a prior term delivery in 2016, a 4 pound 14 ounce infant female at 38 weeks' gestation.  She also had 2 previous  spontaneous miscarriages.  The patient developed the onset of spontaneous labor early in the morning of 10/04/2020.  She presented to [facility] for evaluation.      HOSPITAL COURSE:  The patient was initially evaluated in the maternal assessment area at [facility].  Her cervix was very soft and was 8-9 cm dilated.  The baby was at a 0 station.  Fetal heart tones showed a normal baseline of 130 with moderate variability and some variable decelerations.  The patient was urgently admitted and preparations were made for delivery.  She was transferred to a labor room where an IV was started.  After being notified as the physician for unassigned patients, I arrived in the labor room within 6 minutes.  I examined the patient and the cervix was completely dilated and completely effaced.  Spontaneous rupture of membranes had occurred at 0515 hours.  Fetal heart tones were reassuring, but did show variable decelerations.  The patient was bryan actively every 2 minutes.  There being no time for any analgesics, the patient was encouraged to push once she was prepared for delivery.      SECOND STAGE OF LABOR:  The patient was examined once she arrived in the labor room and she was completely dilated and completely effaced with the vertex at +1 station.  Fetal heart tones were normal, other than variable decelerations.  The patient was prepped and draped in the usual fashion and in the dorsal lithotomy position.  With each contraction, maternal expulsive efforts were begun.  The patient pushed effectively and brought the vertex down in the pelvis.  Shortly thereafter, the baby came to a full crown.  At 0529 hours on 10/04/2020, the patient delivered a viable infant female in the THADDEUS position.  The nose and mouth of the baby were bulb suctioned on the perineum.  The remainder of the baby was delivered without shoulder dystocia.  The baby was immediately placed on the patient's abdomen  and chest.  No resuscitation was necessary and there was no evidence of birth trauma or congenital anomalies.  After a period of delayed cord clamping, the cord was clamped and ligated by the patient's .  The mother and baby remained in excellent condition in the birthing room.      THIRD STAGE OF LABOR:  After a brief third stage of labor lasting 6 minutes, placenta delivered spontaneously intact with a 3-vessel cord.  Examination of the perineum found a second-degree vaginal and perineal laceration.  This was infiltrated with 1% lidocaine and repaired in the usual fashion.  Excellent tissue reapproximation was achieved and the patient tolerated the repair well.  The mother and baby remained in excellent condition in the birthing room.  There were no apparent complications.  The quantitative blood loss was 100 mL.      DELIVERY SUMMARY:  Intrauterine pregnancy at 39 weeks' gestation.  Prenatal care elsewhere with plans to deliver at [facility].  Precipitous labor and imminent delivery upon evaluation and arrival at [facility].  Spontaneous rupture of membranes shortly after admission.  Status post normal spontaneous vaginal delivery, infant female, weight pending, Apgars 9 and 9.  Second-degree perineal and vaginal laceration, repaired.  Spontaneous placental passage, intact.  Group B strep negative.  Low TSH with mild thyromegaly in pregnancy, recommend evaluation of thyroid status postpartum.  COVID-19 nasopharyngeal test was performed after delivery and is pending.         JOSH LUO MD             D: 10/04/2020   T: 10/04/2020   MT: LEANDER      Name:     KASIE CANTRELL   MRN:      [id number]        Account:        [id number]   :      1990        Delivery Date:  10/04/2020               Document: [id number]       cc: Josh Luo MD

## 2020-10-04 NOTE — PLAN OF CARE
Pt. vitals stable. Pain managed with PRN ibuprofen and acetaminophen. Independent in infant and personal cares. Breastfeeding infant. Attentive to infant needs and bonding well with infant. FOB at bedside, supportive.

## 2020-10-04 NOTE — PLAN OF CARE
Patient informed of need for urine tox due to unexplained precipitous delivery (less than 3 hours).  Verbal consent obtained and maternal urine will be sent when she goes to the bathroom next. Umbilical cord segment has been sent for toxicology.

## 2020-10-04 NOTE — PLAN OF CARE
Data: Alexsandra Westbrook transferred to [facility] via wheelchair at 0830. Baby transferred via parent's arms.  Action: Receiving unit notified of transfer: Yes. Patient and family notified of room change. Report given to [place] NAJERA RN at 0820. Belongings sent to receiving unit. Accompanied by Registered Nurse. Oriented patient to surroundings. Call light within reach. ID bands double-checked with receiving RN.  Response: Patient tolerated transfer and is stable.    Patients mobililty level scored using the bedside mobility assistance tool (BMAT). Patient is at a mobility level test number: 4. Mobility equipment used: wheelchair. Required assist of 1 staff members. Further use of BMAT scoring not required. Patient was light headed with getting up to the bathroom, the eric steady was used. Patient verbalized understanding to call for nurse before getting up on her own. Patient reports feeling better with transfer to MB after eating some crackers and drinking some juice. Encouraged to order breakfast.

## 2020-10-04 NOTE — PLAN OF CARE
Patient informed of need for urine tox due to unexplained precipitous delivery (less than 3 hours).  Verbal consent obtained and maternal urine sent. Umbilical cord segment has been sent for toxicology.

## 2020-10-04 NOTE — H&P
Kasie Westbrook  [id number]  OB Admit History & Physical      HPI:  Ms. Westbrook  is a 29 year old  @ 39w0d by ultrasound who presented to L&D for evaluation of active labor. She arrives with no assigned physician or clinic.       Prenatal course:  1st visit at 7  1/7 weeks, regular care, TWG 33#.    Pregnancy complications:  PCOS history, history of LGSIL, vitamin D deficiency, low TSH in pregnancy with thyromegaly (no treatment), isolated EIF (normal NIPT)    Prenatal labs:  A positive, antibody screen negative,  Rubella  Immune, Hep B/HIV/RPR all negative, GC/CT negative, 3 hour GTT one abnormal value (no GDM),  GBS negative; genetic screening tests NIPT normal.    Early ultrasound confirms EDC of 10/11/20    Ultrasound at 12 + weeks gestation showed a small subchorionic hemorrhage    20 week ultrasound normal other than isolated EIF, no previa, normal fluid (NIPT normal)       OB history:   OB History    Para Term  AB Living   4 2 2 0 2 2   SAB TAB Ectopic Multiple Live Births   2 0 0 0 2      # Outcome Date GA Lbr Hansel/2nd Weight Sex Delivery Anes PTL Lv   4 Term 10/04/20 39w0d / 00:14  F Vag-Spont None N DAVID      Name: TAMIA,FEMALE-KASIE      Apgar1: 9  Apgar5: 9   3 SAB 2017     AB, COMPLETE      2 Term 16 38w1d 00:38 / 00:54 2.215 kg (4 lb 14.1 oz) F Vag-Spont Local N DAVID      Birth Comments: Supplementing with formula      Apgar1: 7  Apgar5: 9   1 SAB 04/07/15     SAB            PMHx:     Past Medical History:   Diagnosis Date     LSIL (low grade squamous intraepithelial lesion) on Pap smear 2014    age 23     PCOS (polycystic ovarian syndrome)      Vitamin D deficiency        PSHX:    Past Surgical History:   Procedure Laterality Date     DILATION AND CURETTAGE SUCTION N/A 2015    Procedure: DILATION AND CURETTAGE SUCTION;  Surgeon: Hafsa Myers MD;  Location: [facility] OR       Meds:    No current outpatient medications on file.       Allergies: Patient has no known allergies.      REVIEW  OF SYSTEMS:  Positives and negatives in HPI.     SocHx:    Social History     Socioeconomic History     Marital status: Single     Spouse name: Not on file     Number of children: Not on file     Years of education: Not on file     Highest education level: Not on file   Occupational History     Not on file   Social Needs     Financial resource strain: Not on file     Food insecurity     Worry: Not on file     Inability: Not on file     Transportation needs     Medical: Not on file     Non-medical: Not on file   Tobacco Use     Smoking status: Never Smoker     Smokeless tobacco: Never Used   Substance and Sexual Activity     Alcohol use: No     Drug use: No     Sexual activity: Yes     Partners: Male     Birth control/protection: None   Lifestyle     Physical activity     Days per week: Not on file     Minutes per session: Not on file     Stress: Not on file   Relationships     Social connections     Talks on phone: Not on file     Gets together: Not on file     Attends Mandaeism service: Not on file     Active member of club or organization: Not on file     Attends meetings of clubs or organizations: Not on file     Relationship status: Not on file     Intimate partner violence     Fear of current or ex partner: Not on file     Emotionally abused: Not on file     Physically abused: Not on file     Forced sexual activity: Not on file   Other Topics Concern     Not on file   Social History Narrative    Caffeine intake/servings daily - 0    Calcium intake/servings daily - 3    Exercise 3 times weekly - describe treadmill    Sunscreen used - Yes    Seatbelts used - Yes    Guns stored in the home - No    Self Breast Exam - No    Pap test up to date -  Yes    Eye exam up to date -  Yes    Dental exam up to date -  Yes    DEXA scan up to date -  No    Flex Sig/Colonoscopy up to daCte -  No    Mammography up to date -  No    Immunizations reviewed and up to date - Yes    Abuse: Current or Past (Physical, Sexual or  Emotional) - No    Do you feel safe in your environment - Yes    Do you cope well with stress - Yes    Do you suffer from insomnia - Yes    Last updated by: Dipika Calvo  3/20/2015            Fam Hx:    Family History   Problem Relation Age of Onset     Thyroid Disease Mother         enlarged, hypothyroidism     Diabetes Maternal Grandmother      Cerebrovascular Disease Maternal Grandfather         old age         PHYSICAL EXAM:      Vitals:  LMP 2019   Breastfeeding Unknown   Alert Awake in NAD  ABD gravid, non-tender, EFW 6.5-7#  Cervix:  10 cm / 100 % effaced at 0 station, membranes SROM at 0515 hours, fluid clear  EFM:  Baseline 130, with moderate variability, accels are present or absent, variable decelerations  [place]: contractions q2 min    Assessment:  IUP at 39w0d admitted for evaluation of spontaneous labor, precipitous arriving at [facility] (original delivery hospital [facility]) for evaluation.  Imminent delivery anticipated. GBS negative.  Low TSH this pregnancy with normal to slightly elevated free T4    Plan:  Admission            Covid 19 NP swab            Continuous fetal and uterine monitoring             Will obtain IV access, considering analgesia options but unlikely with imminent delivery.             The plan of care was discussed with the patient and her partner.  They expressed understanding and agreement.             TSH with reflex to T4 post delivery.              Anticipate        Josh Luo MD MD  Dept of OB/GYN  2020

## 2020-10-04 NOTE — PLAN OF CARE
Report given to JONATHAN Milligan who will finish up recovering period and transfer pt when appropriate.

## 2020-10-04 NOTE — PROVIDER NOTIFICATION
10/04/20 0502   Provider Notification   Provider Name/Title Dr. Luo   Method of Notification Phone   Request Attend Delivery   Notification Reason Patient Arrived;Labor Status;SVE   Notified MD of pt arrival in labor,  at 39 weeks, SVE 8-9cm with strong urge to push, requested MD to attend delivery.    Dr. Luo at bedside at 0511.  Updated MD that patient receives OB care at [facility] and is unassigned at [facility].  MD remained at bedside through delivery.    SROM at 0515 with clear fluid.  At 0517, SVE by MD = 10cm.  IV was placed by Rozina Bardai RN.  Patient began pushing at 0526.   of vigorous female infant at 0529.    At 0530, report given to Haydee Lawton RN who assumed care.

## 2020-10-05 VITALS
RESPIRATION RATE: 16 BRPM | TEMPERATURE: 98 F | SYSTOLIC BLOOD PRESSURE: 101 MMHG | HEART RATE: 82 BPM | DIASTOLIC BLOOD PRESSURE: 68 MMHG

## 2020-10-05 LAB
HGB BLD-MCNC: 11.3 G/DL (ref 11.7–15.7)
TSH SERPL DL<=0.005 MIU/L-ACNC: 0.54 MU/L (ref 0.4–4)

## 2020-10-05 PROCEDURE — 84443 ASSAY THYROID STIM HORMONE: CPT | Performed by: OBSTETRICS & GYNECOLOGY

## 2020-10-05 PROCEDURE — 85018 HEMOGLOBIN: CPT | Performed by: OBSTETRICS & GYNECOLOGY

## 2020-10-05 PROCEDURE — 250N000013 HC RX MED GY IP 250 OP 250 PS 637: Performed by: OBSTETRICS & GYNECOLOGY

## 2020-10-05 PROCEDURE — 36415 COLL VENOUS BLD VENIPUNCTURE: CPT | Performed by: OBSTETRICS & GYNECOLOGY

## 2020-10-05 RX ADMIN — ACETAMINOPHEN 650 MG: 325 TABLET, FILM COATED ORAL at 06:11

## 2020-10-05 RX ADMIN — IBUPROFEN 800 MG: 800 TABLET ORAL at 00:40

## 2020-10-05 RX ADMIN — ACETAMINOPHEN 650 MG: 325 TABLET, FILM COATED ORAL at 01:50

## 2020-10-05 RX ADMIN — ACETAMINOPHEN 650 MG: 325 TABLET, FILM COATED ORAL at 11:24

## 2020-10-05 RX ADMIN — IBUPROFEN 800 MG: 800 TABLET ORAL at 14:15

## 2020-10-05 RX ADMIN — DOCUSATE SODIUM AND SENNOSIDES 2 TABLET: 8.6; 5 TABLET ORAL at 09:22

## 2020-10-05 RX ADMIN — IBUPROFEN 800 MG: 800 TABLET ORAL at 06:11

## 2020-10-05 NOTE — PROGRESS NOTES
[facility] Obstetrics Post-Partum Progress Note        Interval History:   Doing well.  Pain is adequately controlled.  Vaginal bleeding is normal postpartum flow.  Breastfeeding well.           Significant Problems:      Patient Active Problem List   Diagnosis     CARDIOVASCULAR SCREENING; LDL GOAL LESS THAN 160     Vitamin D deficiency     PCOS (polycystic ovarian syndrome)     Helicobacter pylori gastritis     Thyromegaly     Irregular periods/menstrual cycles     Migraine with aura and without status migrainosus, not intractable     Need for Tdap vaccination     Indication for care or intervention in labor or delivery     Normal delivery     Indication for care in labor or delivery             Review of Systems:    The patient denies any chest pain, shortness of breath, excessive pain, fever, chills, nausea or vomiting.          Medications:   All medications related to the patient's surgery have been reviewed          Physical Exam:       Patient Vitals for the past 24 hrs:   BP Temp Temp src Pulse Resp   10/05/20 0040 120/74 97.9  F (36.6  C) Oral 93 15   10/04/20 1707 116/79 98.1  F (36.7  C) Oral 98 16   10/04/20 1330 -- -- -- -- 16   10/04/20 1244 -- -- -- -- 16   10/04/20 1150 109/70 98.9  F (37.2  C) Oral 96 16     All vitals stable   Uterine fundus is firm, non-tender and at the level of the umbilicus  Episiotomy sutures intact and wound healing well  Lower extremities without edema or tenderness          Data:     Lab Results   Component Value Date    HGB 11.3 (L) 10/05/2020    HGB 13.7 10/04/2020    HGB 13.5 09/22/2020            Assessment and Plan:    Assessment:    Post-partum day #1  Normal spontaneous vaginal delivery     Doing well.      Plan:   Discharge later today  Note written for the   F/u in 6 weeks      Chante Leary MD  October 5, 2020  8:42 AM

## 2020-10-05 NOTE — PLAN OF CARE
Patient discharged to home with infant and significant other. Discharge instructions, when to call the doctor and when to follow up reviewed by Blanche Corcoran RN through [place] , patient verbalized understanding and denies further questions. Patient discharged ambulatory at 1705.

## 2020-10-05 NOTE — PLAN OF CARE
Patient VSS. Ambulating and independent with cares. Pain is well managed with PRN Ibuprofen and Tylenol. Breastfeeding well with some assistance requested from staff. Assisted with achieving a deeper latch and answered all questions. Questions regarding formula supplementation were addressed. Parents are attentive to infant needs and bonding well with infant.

## 2020-10-05 NOTE — DISCHARGE INSTRUCTIONS
Postpartum Vaginal Delivery Instructions    Toney URBINA: Follow up in 6 weeks, sooner if needed. Phone [phone]    Activity       Ask family and friends for help when you need it.    Do not place anything in your vagina for 6 weeks.    You are not restricted on other activities, but take it easy for a few weeks to allow your body to recover from delivery.  You are able to do any activities you feel up to that point.    No driving until you have stopped taking your pain medications (usually two weeks after delivery).     Call your health care provider if you have any of these symptoms:       Increased pain, swelling, redness, or fluid around your stiches from an episiotomy or perineal tear.    A fever above 100.4 F (38 C) with or without chills when placing a thermometer under your tongue.    You soak a sanitary pad with blood within 1 hour, or you see blood clots larger than a golf ball.    Bleeding that lasts more than 6 weeks.    Vaginal discharge that smells bad.    Severe pain, cramping or tenderness in your lower belly area.    A need to urinate more frequently (use the toilet more often), more urgently (use the toilet very quickly), or it burns when you urinate.    Nausea and vomiting.    Redness, swelling or pain around a vein in your leg.    Problems breastfeeding or a red or painful area on your breast.    Chest pain and cough or are gasping for air.    Problems coping with sadness, anxiety, or depression.  If you have any concerns about hurting yourself or the baby, call your provider immediately.     You have questions or concerns after you return home.     Keep your hands clean:  Always wash your hands before touching your perineal area and stitches.  This helps reduce your risk of infection.  If your hands aren't dirty, you may use an alcohol hand-rub to clean your hands. Keep your nails clean and short.

## 2020-10-05 NOTE — PLAN OF CARE
Vital signs stable on room air. Bonding well with infant.  at bedside and supportive. Breastfeeding well with minimal assistance from staff. Independent with cares for self and infant. Pain adequately controled with ibuprofen and tylenol. Tolerating a regular diet.

## 2020-10-09 ENCOUNTER — OFFICE VISIT (OUTPATIENT)
Dept: FAMILY MEDICINE | Facility: CLINIC | Age: 30
End: 2020-10-09
Payer: COMMERCIAL

## 2020-10-09 VITALS
OXYGEN SATURATION: 98 % | HEART RATE: 74 BPM | DIASTOLIC BLOOD PRESSURE: 82 MMHG | SYSTOLIC BLOOD PRESSURE: 142 MMHG | TEMPERATURE: 98.4 F | BODY MASS INDEX: 26.74 KG/M2 | WEIGHT: 145 LBS

## 2020-10-09 DIAGNOSIS — R59.0 AXILLARY LYMPHADENOPATHY: Primary | ICD-10-CM

## 2020-10-09 PROCEDURE — 99213 OFFICE O/P EST LOW 20 MIN: CPT | Performed by: FAMILY MEDICINE

## 2020-10-09 NOTE — PROGRESS NOTES
Subjective     Alexsandra Westbrook is a 29 year old female who presents to clinic today for the following health issues:    HPI         Pt here today for follow up on lymph nodes on both under arms per LENNOX Warren come back to the clinic if doesn't go away.    Has an enlarged gland, on the left axilla.  Now has one on right side.  On left side, noticed first more than a month ago in August.  Has not gotten bigger or changed, just persists.  Sore to the touch.  Right side was just noticed a week ago, also sore to the touch.  Has not noted enlarged lymph nodes in neck or groin now, but had some on the left side of neck before.    Denies fevers or chills, nausea or vomiting.   Has had some fatigue, some weight loss, both after delivery of baby.        Review of Systems   Constitutional, HEENT, cardiovascular, pulmonary, gi and gu systems are negative, except as otherwise noted.      Objective    BP (!) 142/82   Pulse 74   Temp 98.4  F (36.9  C) (Oral)   Wt 65.8 kg (145 lb)   LMP 12/30/2019   SpO2 98%   BMI 26.74 kg/m    Body mass index is 26.74 kg/m .  Physical Exam   GENERAL: healthy, alert and no distress  LYMPH: Approximately 1 cm left axillary lump, oblong shaped, questionable lymph node, mobile, slightly tender to palpation.  Right axilla has one lump, ~5 mm, firm, mobile, minimally tender to palpation.          Assessment & Plan     Axillary lymphadenopathy  Will order US of axillae given presence of lumps longer than one month in duration.  Further recommendations pending results.   - US Extremity Non Vascular Bilateral; Future        See Patient Instructions    No follow-ups on file.    Dinah Ochoa MD  [facility]

## 2020-10-09 NOTE — PATIENT INSTRUCTIONS
[facility],  [facility] [phone],   Toll Free: [phone]

## 2020-10-12 ENCOUNTER — TELEPHONE (OUTPATIENT)
Dept: FAMILY MEDICINE | Facility: CLINIC | Age: 30
End: 2020-10-12

## 2020-10-12 DIAGNOSIS — R59.0 AXILLARY LYMPHADENOPATHY: Primary | ICD-10-CM

## 2020-10-12 NOTE — TELEPHONE ENCOUNTER
[facility] scheduling.  Order needs to be changed to bilateral diagnostic mammo with U/S of (L) axillary.  Can not be done at radiology and [facility].  Beth will watch for new order and call to schedule.  T'd up.  Please advise.  Natalee Sosa RN

## 2020-10-15 ENCOUNTER — HOSPITAL ENCOUNTER (OUTPATIENT)
Dept: MAMMOGRAPHY | Facility: CLINIC | Age: 30
End: 2020-10-15
Attending: FAMILY MEDICINE
Payer: COMMERCIAL

## 2020-10-15 DIAGNOSIS — R59.0 AXILLARY LYMPHADENOPATHY: ICD-10-CM

## 2020-10-15 PROCEDURE — 76882 US LMTD JT/FCL EVL NVASC XTR: CPT | Mod: 50

## 2020-11-19 ENCOUNTER — VIRTUAL VISIT (OUTPATIENT)
Dept: OBGYN | Facility: CLINIC | Age: 30
End: 2020-11-19
Payer: COMMERCIAL

## 2020-11-19 NOTE — PROGRESS NOTES
"Kasie Westbrook is a 29 year old female who is being evaluated via a billable telephone visit.      The patient has been notified of following:     \"This telephone visit will be conducted via a call between you and your physician/provider. We have found that certain health care needs can be provided without the need for a physical exam.  This service lets us provide the care you need with a short phone conversation.  If a prescription is necessary we can send it directly to your pharmacy.  If lab work is needed we can place an order for that and you can then stop by our lab to have the test done at a later time.    Telephone visits are billed at different rates depending on your insurance coverage. During this emergency period, for some insurers they may be billed the same as an in-person visit.  Please reach out to your insurance provider with any questions.    If during the course of the call the physician/provider feels a telephone visit is not appropriate, you will not be charged for this service.\"    Patient has given verbal consent for Telephone visit?  Yes    What phone number would you like to be contacted at? [phone]    How would you like to obtain your AVS? [place]      OB GYN VISIT  2020  CC: postpartum visit  Phone visit for modified prenatal care for COVID.    SUBJECTIVE:  Kasie Westbrook is a 29 year old female  here for a postpartum visit.  She had a  on 10/4/2020 delivering a healthy baby girl weighing 7 lbs 14 oz at 39w0d.  She delivered at [facility].    OB History    Para Term  AB Living   4 2 2 0 2 2   SAB TAB Ectopic Multiple Live Births   2 0 0 0 2      # Outcome Date GA Lbr Hansel/2nd Weight Sex Delivery Anes PTL Lv   4 Term 10/04/20 39w0d / 00:14 3.572 kg (7 lb 14 oz) F Vag-Spont None N DAVID      Name: TAMIA,FEMALE-KASIE      Apgar1: 9  Apgar5: 9   3 SAB 2017     AB, COMPLETE      2 Term 16 38w1d 00:38 / 00:54 2.215 kg (4 lb 14.1 oz) F Vag-Spont Local N DAVID      Birth " Comments: Supplementing with formula      Apgar1: 7  Apgar5: 9   1 SAB //15     SAB          delivery complications:  No  breast feeding:  Both breast feeding and supplementing with formula. Sometimes spits out milk. Had an ultrasound for lumps in armpits - axillary breast tissue seen bilaterally  bladder problems:  No  bowel problems/hemorrhoids:  No  episiotomy/laceration/incision healed? Yes. Second degree laceration.  vaginal flow:  None  [place]:  No  contraception:  Undecided, considering the patch  emotional adjustment:  doing well and happy  back to work:  Next month    Not currently taking any medication for thyroid.    Current Outpatient Medications   Medication     acetaminophen (TYLENOL) 325 MG tablet     acetaminophen (TYLENOL) 325 MG tablet     cholecalciferol (VITAMIN D3) 5000 units (125 mcg) capsule     ferrous sulfate (FEROSUL) 325 (65 Fe) MG tablet     ibuprofen (ADVIL/MOTRIN) 600 MG tablet     Prenatal Vit-Fe Fumarate-FA (PRENATAL COMPLETE PO)     senna-docusate (SENOKOT-S/PERICOLACE) 8.6-50 MG tablet     No current facility-administered medications for this visit.        OBJECTIVE:  Last menstrual period 2019, unknown if currently breastfeeding.   No exam performed    ASSESSMENT:  29 year old  with normal postpartum exam    PLAN:  May resume normal activities without restrictions  Pap smear was not  done today - due in .    The patient will use none for birth control. She is considering patches but plans to not have sex for a while, will call us when she wants rx sent. Full counseling was provided, and all questions answered. Compliance is strongly emphasized.  Return to clinic in one year for an annual, when due for a pap smear or PRN.      Phone call duration: 14 minutes    Leena Hung MD

## 2021-08-02 ENCOUNTER — OFFICE VISIT (OUTPATIENT)
Dept: OBGYN | Facility: CLINIC | Age: 31
End: 2021-08-02
Payer: COMMERCIAL

## 2021-08-02 VITALS
WEIGHT: 150 LBS | SYSTOLIC BLOOD PRESSURE: 102 MMHG | BODY MASS INDEX: 27.6 KG/M2 | DIASTOLIC BLOOD PRESSURE: 62 MMHG | HEIGHT: 62 IN

## 2021-08-02 DIAGNOSIS — N92.6 IRREGULAR MENSES: Primary | ICD-10-CM

## 2021-08-02 LAB — HCG UR QL: NEGATIVE

## 2021-08-02 PROCEDURE — 36415 COLL VENOUS BLD VENIPUNCTURE: CPT | Performed by: OBSTETRICS & GYNECOLOGY

## 2021-08-02 PROCEDURE — 84403 ASSAY OF TOTAL TESTOSTERONE: CPT | Performed by: OBSTETRICS & GYNECOLOGY

## 2021-08-02 PROCEDURE — 84443 ASSAY THYROID STIM HORMONE: CPT | Performed by: OBSTETRICS & GYNECOLOGY

## 2021-08-02 PROCEDURE — 99215 OFFICE O/P EST HI 40 MIN: CPT | Performed by: OBSTETRICS & GYNECOLOGY

## 2021-08-02 PROCEDURE — 84270 ASSAY OF SEX HORMONE GLOBUL: CPT | Performed by: OBSTETRICS & GYNECOLOGY

## 2021-08-02 PROCEDURE — 81025 URINE PREGNANCY TEST: CPT | Performed by: OBSTETRICS & GYNECOLOGY

## 2021-08-02 ASSESSMENT — MIFFLIN-ST. JEOR: SCORE: 1349.68

## 2021-08-02 NOTE — PROGRESS NOTES
"  SUBJECTIVE:                                                   Alexsandra Westbrook is a 30 year old female who presents to clinic today for the following health issue(s):  Patient presents with:  Abnormal Bleeding Problem: patient was breastfeeding and after stopping did get regular cycles, she is now having irregular cycles.      HPI:    Periods are irregular, states that cycles \"come more frequently than normal.\" Can have bleeding/spotting sometimes up to a month. Bleeding light.  On heaviest day, 3-4 pads a day. Just noticing blood with wiping.  No pad needed.    Dysmenorrhea mild, occurring more on the left side.  Can happen with bleeding, every time.  Will use tylenol.  This seems to help.   Cyclic symptoms include  none.   Birth control method: none currently.  Is currently sexually active.  Started becoming irregular about 6 months after delivery  Periods were irregular prior to periods.  Was told has PCOS.  No birth control.  Was told if wanted children, would need medicine.  Did have medication to help with first pregnancy, but second was spontaneous.       No LMP recorded. (Menstrual status: Irregular Periods)..   Patient is sexually active, .  Using nothing for contraception.    reports that she has never smoked. She has never used smokeless tobacco.  STD testing offered?  Declined  Health maintenance updated:  yes    Today's PHQ-2 Score:   PHQ-2 (  [employer]) 4/10/2020   Q1: Little interest or pleasure in doing things 0   Q2: Feeling down, depressed or hopeless 0   PHQ-2 Score 0   Q1: Little interest or pleasure in doing things Not at all   Q2: Feeling down, depressed or hopeless Not at all   PHQ-2 Score 0     Today's PHQ-9 Score:   PHQ-9 SCORE 2016   PHQ-9 Total Score 0     Today's HARMEET-7 Score: No flowsheet data found.    Problem list and histories reviewed & adjusted, as indicated.  Additional history: as documented.    Patient Active Problem List   Diagnosis     CARDIOVASCULAR SCREENING; LDL GOAL " "LESS THAN 160     Vitamin D deficiency     PCOS (polycystic ovarian syndrome)     Helicobacter pylori gastritis     Thyromegaly     Irregular periods/menstrual cycles     Migraine with aura and without status migrainosus, not intractable     Need for Tdap vaccination     Indication for care or intervention in labor or delivery     Normal delivery     Indication for care in labor or delivery     Past Surgical History:   Procedure Laterality Date     DILATION AND CURETTAGE SUCTION N/A 4/7/2015    Procedure: DILATION AND CURETTAGE SUCTION;  Surgeon: Hafsa Myers MD;  Location:  OR      Social History     Tobacco Use     Smoking status: Never Smoker     Smokeless tobacco: Never Used   Substance Use Topics     Alcohol use: No      Problem (# of Occurrences) Relation (Name,Age of Onset)    Cerebrovascular Disease (1) Maternal Grandfather: old age    Diabetes (1) Maternal Grandmother    Thyroid Disease (1) Mother: enlarged, hypothyroidism            No current outpatient medications on file.     No current facility-administered medications for this visit.     No Known Allergies    ROS:  12 point review of systems negative other than symptoms noted below or in the HPI.  Genitourinary: Irregular Menses  No urinary frequency or dysuria, bladder or kidney problems      OBJECTIVE:     /62   Ht 1.568 m (5' 1.75\")   Wt 68 kg (150 lb)   BMI 27.66 kg/m    Body mass index is 27.66 kg/m .    Exam:  Constitutional:  Appearance: Well nourished, well developed alert, in no acute distress  Psychiatric:  Mentation appears normal and affect normal/bright.  Pelvic Exam:  External Genitalia:     Normal appearance for age, no discharge present, no tenderness present, no inflammatory lesions present, color normal  Vagina:     Normal vaginal vault without central or paravaginal defects, no discharge present, no inflammatory lesions present, no masses present  Bladder:     Nontender to palpation  Urethra:   Urethral Body:  " Urethra palpation normal, urethra structural support normal   Urethral Meatus:  No erythema or lesions present  Cervix:     Appearance healthy, no lesions present, nontender to palpation, no bleeding present  Uterus:     Uterus: firm, normal sized and nontender, midplane in position.   Adnexa:     No adnexal tenderness present, no adnexal masses present  Perineum:     Perineum within normal limits, no evidence of trauma, no rashes or skin lesions present  Anus:     Anus within normal limits, no hemorrhoids present  Inguinal Lymph Nodes:     No lymphadenopathy present  Pubic Hair:     Normal pubic hair distribution for age  Genitalia and Groin:     No rashes present, no lesions present, no areas of discoloration, no masses present       In-Clinic Test Results:  Results for orders placed or performed in visit on 08/02/21 (from the past 24 hour(s))   HCG Qual, Urine ([id number])   Result Value Ref Range    hCG Urine Qualitative Negative Negative       ASSESSMENT/PLAN:                                                        ICD-10-CM    1. Irregular menses  N92.6 TSH with free T4 reflex     HCG Qual, Urine ([id number])     Testosterone Free and Total     HCG Qual, Urine ([id number])     TSH with free T4 reflex     Testosterone Free and Total       There are no Patient Instructions on file for this visit.    -Discussed polycystic ovarian syndrome. Explained that polycystic ovary syndrome (PCOS) is an important cause of both menstrual irregularity and androgen excess in women. Discussed that it can cause irregular menstrual cycles, hirsutism, obesity, insulin resistance, and anovulatory infertility. Specifically explained that due to PCOS she may be at increased risk of type 2 diabetes, cardiovascular disease, endometrial hyperplasia, endometrial cancer, and infertility.   -Counseled that diet and exercise for weight reduction are the first steps for overweight and obese women with PCOS. Weight loss can restore ovulatory cycles and  improve metabolic risk. Also discussed that a lower carb diet is often beneficial. I recommended a nutrition consultation first to discuss what diet may be good for her.  -Discussed that oral contraceptives (OCs) are the mainstay of pharmacologic therapy for women with PCOS for managing hyperandrogenism, treating menstrual dysfunction and protecting endometrial lining. Additionally, these provide contraception.   -For women with PCOS who choose not to or cannot take OCs, we discussed alternative treatments for endometrial protection including intermittent or continuous progestin therapy, or a progestin-releasing intrauterine device (IUD). Depo provera, Nuvaring, the patch, and Nexplanon may also provide this protection in addition to contraception.  -Additionally discussed metformin as an alternative that can restore ovulatory menses in some women with PCOS, and that this can be particularly beneficial in women with documented hyperinsulinemia.  -Plan for now is to do further lab evaluation and then follow-up to make a plan.  Patient has migraine with aura so is not candidate for estrogen containing OCP.    Follow-up in 2 weeks.    (40 minutes was spent on the date of the encounter doing chart review, review of outside records, review and interpretation of pertinent test results, history and exam, documentation, patient counseling, and further activities as noted above.)     Tanya Staples MD  [facility] [place]

## 2021-08-03 LAB — TSH SERPL DL<=0.005 MIU/L-ACNC: 0.7 MU/L (ref 0.4–4)

## 2021-08-04 LAB
SHBG SERPL-SCNC: 34 NMOL/L (ref 30–135)
TESTOST FREE SERPL-MCNC: 0.87 NG/DL
TESTOST SERPL-MCNC: 49 NG/DL (ref 8–60)

## 2021-09-14 ENCOUNTER — OFFICE VISIT (OUTPATIENT)
Dept: MIDWIFE SERVICES | Facility: CLINIC | Age: 31
End: 2021-09-14
Payer: COMMERCIAL

## 2021-09-14 VITALS
BODY MASS INDEX: 27.23 KG/M2 | SYSTOLIC BLOOD PRESSURE: 118 MMHG | DIASTOLIC BLOOD PRESSURE: 68 MMHG | HEIGHT: 62 IN | WEIGHT: 148 LBS

## 2021-09-14 DIAGNOSIS — Z30.09 GENERAL COUNSELING AND ADVICE ON CONTRACEPTIVE MANAGEMENT: Primary | ICD-10-CM

## 2021-09-14 DIAGNOSIS — Z87.42 HISTORY OF PCOS: ICD-10-CM

## 2021-09-14 PROBLEM — Z23 NEED FOR TDAP VACCINATION: Status: RESOLVED | Noted: 2020-02-24 | Resolved: 2021-09-14

## 2021-09-14 PROCEDURE — 99214 OFFICE O/P EST MOD 30 MIN: CPT | Performed by: ADVANCED PRACTICE MIDWIFE

## 2021-09-14 ASSESSMENT — MIFFLIN-ST. JEOR: SCORE: 1340.6

## 2021-09-14 NOTE — PROGRESS NOTES
SUBJECTIVE:   Alexsandra Westbrook is a 30 year old who presents to the clinic for discussion of birth control methods.   She has used the following methods in the past: None/None needed  Today she is interested in discussing Mirena IUD    Denies unprotected intercourse for the last two weeks but not currently using contraception. Her baby is almost 1 years old. Recent lengthy period, does not know when it started, also reports some left sided pelvic pain with cycles. Would like 5 year IUD, thought she could have placement today but was not sure which IUD she wanted and her visit with  discussed many kinds of contraception    LMP: Unsure  Menstrual History:  Abnormal spotting bleeding    Histories reviewed and updated  Past Medical History:   Diagnosis Date     LSIL (low grade squamous intraepithelial lesion) on Pap smear 7/2014    age 23     PCOS (polycystic ovarian syndrome)      Vitamin D deficiency      Past Surgical History:   Procedure Laterality Date     DILATION AND CURETTAGE SUCTION N/A 4/7/2015    Procedure: DILATION AND CURETTAGE SUCTION;  Surgeon: Hafsa Myers MD;  Location:  OR     Social History     Socioeconomic History     Marital status: Single     Spouse name: Not on file     Number of children: Not on file     Years of education: Not on file     Highest education level: Not on file   Occupational History     Not on file   Tobacco Use     Smoking status: Never Smoker     Smokeless tobacco: Never Used   Substance and Sexual Activity     Alcohol use: No     Drug use: No     Sexual activity: Yes     Partners: Male     Birth control/protection: None   Other Topics Concern     Not on file   Social History Narrative    Caffeine intake/servings daily - 0    Calcium intake/servings daily - 3    Exercise 3 times weekly - describe treadmill    Sunscreen used - Yes    Seatbelts used - Yes    Guns stored in the home - No    Self Breast Exam - No    Pap test up to date -  Yes    Eye exam up to date -  Yes     Dental exam up to date -  Yes    DEXA scan up to date -  No    Flex Sig/Colonoscopy up to daCte -  No    Mammography up to date -  No    Immunizations reviewed and up to date - Yes    Abuse: Current or Past (Physical, Sexual or Emotional) - No    Do you feel safe in your environment - Yes    Do you cope well with stress - Yes    Do you suffer from insomnia - Yes    Last updated by: Dipika Calvo  3/20/2015         Social Determinants of Health     Financial Resource Strain:      Difficulty of Paying Living Expenses:    Food Insecurity:      Worried About Running Out of Food in the Last Year:      Ran Out of Food in the Last Year:    Transportation Needs:      Lack of Transportation (Medical):      Lack of Transportation (Non-Medical):    Physical Activity:      Days of Exercise per Week:      Minutes of Exercise per Session:    Stress:      Feeling of Stress :    Social Connections:      Frequency of Communication with Friends and Family:      Frequency of Social Gatherings with Friends and Family:      Attends [employer] Services:      Active Member of Clubs or Organizations:      Attends Club or Organization Meetings:      Marital Status:    Intimate Partner Violence:      Fear of Current or Ex-Partner:      Emotionally Abused:      Physically Abused:      Sexually Abused:      Family History   Problem Relation Age of Onset     Thyroid Disease Mother         enlarged, hypothyroidism     Diabetes Maternal Grandmother      Cerebrovascular Disease Maternal Grandfather         old age       Denies the following contraindications to the IUD:  Distortion of the uterine cavity  Gamaliel's disease/copper allergy  Active pelvic infection  Unexplained uterine bleeding  Known or suspected pregnancy  Breast cancer or liver disease    Health maintenance updated:  no    ROS:   12 point review of systems negative other than symptoms noted below or in the HPI.  Genitourinary: Irregular Menses and Pelvic  "Pain    EXAM:  /68   Ht 1.568 m (5' 1.75\")   Wt 67.1 kg (148 lb)   BMI 27.29 kg/m    Body mass index is 27.29 kg/m .    ASSESSMENT/PLAN:    ICD-10-CM    1. General counseling and advice on contraceptive management  Z30.09    2. History of PCOS  Z87.42      There are no contraindications to the use of Kyleena    COUNSELING:  Reviewed risks and benefits of contraceptive use and procedure  Discussed proper use of chosen method  Discussed different types of IUDs including paragard, reviewed mirena for 7 yrs, kyleena for 5, and julius for 3  Reviewed procedure, placed with speculum, ~20-30 minutes, recommend ibuprofen ahea of time 600 mg 1-2 hrs prior to proceed  No unprotected sex for 2 weeks prior to placement, recommend condoms from now until placement, UPT day of  Discussed pelvic pain may be related to PCOS, possible left cyst, per US during pregnancy no evidence of any cysts on right or left side, plan for follow up if pain persists  Will consult with Dr. Staples to see if she wishes for patient to have any further labs done at next visit  Scheduled visit 9/30 for IUD placement    30 minutes spent on the date of the encounter doing review of test results, interpretation of tests, patient visit and documentation       used via Ipad    KIANA Calvo CNM      "

## 2021-09-15 NOTE — PROGRESS NOTES
"  IUD Insertion:  CONSULT:    Is a pregnancy test required: Yes.  Was it positive or negative?  Negative  Was a consent obtained?  Yes    Subjective: Alexsandra Westbrook is a 30 year old  presents for IUD and desires Kyleena type IUD.    Patient has been given the opportunity to ask questions about all forms of birth control, including all options appropriate for Alexsandra Westbrook. Discussed that no method of birth control, except abstinence is 100% effective against pregnancy or sexually transmitted infection.     Alexsandra Westbrook understands she may have the IUD removed at any time. IUD should be removed by a health care provider.    The entire insertion procedure was reviewed with the patient, including care after placement.    No LMP recorded. (Menstrual status: Irregular Periods). Last sexual activity: over 2 weeks ago. No allergy to betadine or shellfish. Patient declines STD screening    /80   Ht 1.568 m (5' 1.75\")   Wt 66.7 kg (147 lb)   BMI 27.10 kg/m      Pelvic Exam:   EG/BUS: normal genital architecture without lesions, erythema or abnormal secretions.   Vagina: moist, pink, rugae with physiologic discharge and secretions  Cervix: multiparous no lesions and pink, moist, closed, without lesion or CMT  Uterus: slightly anteverted position, mobile, no pain  Adnexa: within normal limits and no masses, nodularity, tenderness    PROCEDURE NOTE: -- IUD Insertion    Reason for Insertion: contraception    Premedicated with ibuprofen.  Under sterile technique, cervix was visualized with speculum and prepped with Betadine solution swab x 3. Tenaculum was placed for stability. The uterus was gently straightened and sounded to 7.0 cm. IUD prepared for placement, and IUD inserted according to 's instructions without difficulty or significant resitance, and deployed at the fundus. The strings were visualized and trimmed to 3.0 cm from the external os. Tenaculum was removed and hemostasis noted. Speculum removed.  Patient " tolerated procedure well.    Lot # [id number]  Exp: 03/31/2023  NDC: 33729-568-68    EBL: minimal    Complications: none    ASSESSMENT:     ICD-10-CM    1. Encounter for insertion of intrauterine contraceptive device  Z30.430 levonorgestrel (KYLEENA) 19.5 MG IUD     levonorgestrel (KYLEENA) 19.5 MG IUD 19.5 mg     INSERTION INTRAUTERINE DEVICE   2. Pre-procedure lab exam  Z01.812 HCG Qual, Urine ([id number])     HCG Qual, Urine ([id number])        PLAN:    Given 's handouts, including when to have IUD removed, list of danger s/sx, side effects and follow up recommended. Encouraged condom use for prevention of STD. Back up contraception advised for 7 days if progestin method. Advised to call for any fever, for prolonged or severe pain or bleeding, abnormal vaginal discharge, or unable to palpate strings. She was advised to use pain medications (ibuprofen) as needed for mild to moderate pain. Advised to follow-up in clinic in 4-6 weeks for IUD string check if unable to find strings or as directed by provider.     Mallory Hawkins MD

## 2021-09-16 ENCOUNTER — OFFICE VISIT (OUTPATIENT)
Dept: OBGYN | Facility: CLINIC | Age: 31
End: 2021-09-16
Payer: COMMERCIAL

## 2021-09-16 VITALS
WEIGHT: 147 LBS | BODY MASS INDEX: 27.05 KG/M2 | SYSTOLIC BLOOD PRESSURE: 124 MMHG | DIASTOLIC BLOOD PRESSURE: 80 MMHG | HEIGHT: 62 IN

## 2021-09-16 DIAGNOSIS — Z30.430 ENCOUNTER FOR INSERTION OF INTRAUTERINE CONTRACEPTIVE DEVICE: Primary | ICD-10-CM

## 2021-09-16 DIAGNOSIS — Z01.812 PRE-PROCEDURE LAB EXAM: ICD-10-CM

## 2021-09-16 PROBLEM — Z97.5 IUD (INTRAUTERINE DEVICE) IN PLACE: Status: ACTIVE | Noted: 2021-09-16

## 2021-09-16 LAB — HCG UR QL: NEGATIVE

## 2021-09-16 PROCEDURE — 58300 INSERT INTRAUTERINE DEVICE: CPT | Performed by: OBSTETRICS & GYNECOLOGY

## 2021-09-16 PROCEDURE — 81025 URINE PREGNANCY TEST: CPT | Performed by: OBSTETRICS & GYNECOLOGY

## 2021-09-16 ASSESSMENT — MIFFLIN-ST. JEOR: SCORE: 1336.07

## 2021-09-16 NOTE — PATIENT INSTRUCTIONS
Kyleena IUD placed without issues today.  Will use back up contraception for one week and return for IUD check in 6 weeks.

## 2021-10-03 ENCOUNTER — HEALTH MAINTENANCE LETTER (OUTPATIENT)
Age: 31
End: 2021-10-03

## 2021-11-10 NOTE — PROGRESS NOTES
SUBJECTIVE:                                                   Alexsandra Westbrook is a 30 year old female who presents to clinic today for the following health issue(s):  Patient presents with:  IUD: C/o consistent light bleeding since having Kyleena placed 2 months ago.     HPI:  Patient here today with the help of a video  to discuss some breakthrough bleeding on a Kyleena IUD.  She had the IUD placed 2 months ago and has had bright red bleeding/spotting ever since.  She denies any pelvic cramping.  She has not been able to check her strings.    Patient's last menstrual period was 2021.    Patient is not sexually active, .  Using not sexually active for contraception.    reports that she has never smoked. She has never used smokeless tobacco.    STD testing offered?  Declined    Health maintenance updated:  yes    Today's PHQ-2 Score:   PHQ-2 (  [employer]) 4/10/2020   Q1: Little interest or pleasure in doing things 0   Q2: Feeling down, depressed or hopeless 0   PHQ-2 Score 0   Q1: Little interest or pleasure in doing things Not at all   Q2: Feeling down, depressed or hopeless Not at all   PHQ-2 Score 0     Today's PHQ-9 Score:   PHQ-9 SCORE 2016   PHQ-9 Total Score 0     Today's HARMEET-7 Score: No flowsheet data found.    Problem list and histories reviewed & adjusted, as indicated.  Additional history: as documented.    Patient Active Problem List   Diagnosis     Vitamin D deficiency     PCOS (polycystic ovarian syndrome)     Helicobacter pylori gastritis     Thyromegaly     Irregular periods/menstrual cycles     Migraine with aura and without status migrainosus, not intractable     IUD (intrauterine device) in place     Past Surgical History:   Procedure Laterality Date     DILATION AND CURETTAGE SUCTION N/A 2015    Procedure: DILATION AND CURETTAGE SUCTION;  Surgeon: Hafsa Myers MD;  Location: [facility] OR      Social History     Tobacco Use     Smoking status: Never Smoker     Smokeless  "tobacco: Never Used   Substance Use Topics     Alcohol use: No      Problem (# of Occurrences) Relation (Name,Age of Onset)    Cerebrovascular Disease (1) Maternal Grandfather: old age    Diabetes (1) Maternal Grandmother    Thyroid Disease (1) Mother: enlarged, hypothyroidism            Current Outpatient Medications   Medication Sig     levonorgestrel (KYLEENA) 19.5 MG IUD 1 each (19.5 mg) by Intrauterine route once     No current facility-administered medications for this visit.     No Known Allergies    ROS:  12 point review of systems negative other than symptoms noted below or in the HPI.  Genitourinary: Spotting  No urinary frequency or dysuria, bladder or kidney problems      OBJECTIVE:     /70   Ht 1.568 m (5' 1.75\")   Wt 66.7 kg (147 lb)   LMP 09/17/2021   BMI 27.10 kg/m    Body mass index is 27.1 kg/m .    Exam:  Constitutional:  Appearance: Well nourished, well developed alert, in no acute distress  Psychiatric:  Mentation appears normal and affect normal/bright.  Pelvic Exam:  External Genitalia:     Normal appearance for age, no discharge present, no tenderness present, no inflammatory lesions present, color normal  Vagina:    Normal vaginal vault without central or paravaginal defects, no discharge present, no inflammatory lesions present, no masses present blood in vault  Bladder:     Nontender to palpation  Urethra:   Urethral Body:  Urethra palpation normal, urethra structural support normal   Urethral Meatus:  No erythema or lesions present  Cervix:     Appearance healthy, no lesions present, nontender to palpation, bright red menstrual bleeding present, string present  Uterus:     Nontender to palpation, no masses present, position anteflexed, mobility: normal  Adnexa:     No adnexal tenderness present, no adnexal masses present  Perineum:     Perineum within normal limits, no evidence of trauma, no rashes or skin lesions present  Anus:     Anus within normal limits, no hemorrhoids " present  Inguinal Lymph Nodes:     No lymphadenopathy present  Pubic Hair:     Normal pubic hair distribution for age  Genitalia and Groin:     No rashes present, no lesions present, no areas of discoloration, no masses present       In-Clinic Test Results:  No results found for this or any previous visit (from the past 24 hour(s)).    ASSESSMENT/PLAN:                                                        ICD-10-CM    1. Breakthrough bleeding associated with intrauterine device (IUD)  N92.1     Z97.5        There are no Patient Instructions on file for this visit.    30-year-old female with a new Kyleena IUD who is experiencing some breakthrough bleeding.  Reassurance is her IUD strings were fully visualized that breakthrough bleeding unfortunately is normal and expected for the first couple months up to 1 year of the IUD.  She is to call if she is saturating a pad or tampon in an hour or less.    KIANA Vale [facility] [place]

## 2021-11-11 ENCOUNTER — OFFICE VISIT (OUTPATIENT)
Dept: OBGYN | Facility: CLINIC | Age: 31
End: 2021-11-11
Payer: COMMERCIAL

## 2021-11-11 VITALS
BODY MASS INDEX: 27.05 KG/M2 | SYSTOLIC BLOOD PRESSURE: 102 MMHG | HEIGHT: 62 IN | DIASTOLIC BLOOD PRESSURE: 70 MMHG | WEIGHT: 147 LBS

## 2021-11-11 DIAGNOSIS — N92.1 BREAKTHROUGH BLEEDING ASSOCIATED WITH INTRAUTERINE DEVICE (IUD): Primary | ICD-10-CM

## 2021-11-11 DIAGNOSIS — Z97.5 BREAKTHROUGH BLEEDING ASSOCIATED WITH INTRAUTERINE DEVICE (IUD): Primary | ICD-10-CM

## 2021-11-11 PROCEDURE — 99212 OFFICE O/P EST SF 10 MIN: CPT | Performed by: NURSE PRACTITIONER

## 2021-11-11 ASSESSMENT — MIFFLIN-ST. JEOR: SCORE: 1336.07

## 2022-01-23 ENCOUNTER — HEALTH MAINTENANCE LETTER (OUTPATIENT)
Age: 32
End: 2022-01-23

## 2022-01-28 ENCOUNTER — IMMUNIZATION (OUTPATIENT)
Dept: NURSING | Facility: CLINIC | Age: 32
End: 2022-01-28
Payer: COMMERCIAL

## 2022-01-28 PROCEDURE — 91306 COVID-19,PF,MODERNA (18+ YRS BOOSTER .25ML): CPT

## 2022-01-28 PROCEDURE — 0064A COVID-19,PF,MODERNA (18+ YRS BOOSTER .25ML): CPT

## 2022-09-10 ENCOUNTER — HEALTH MAINTENANCE LETTER (OUTPATIENT)
Age: 32
End: 2022-09-10

## 2022-11-24 ENCOUNTER — OFFICE VISIT (OUTPATIENT)
Dept: URGENT CARE | Facility: URGENT CARE | Age: 32
End: 2022-11-24
Payer: COMMERCIAL

## 2022-11-24 VITALS
DIASTOLIC BLOOD PRESSURE: 97 MMHG | HEART RATE: 106 BPM | TEMPERATURE: 98.9 F | SYSTOLIC BLOOD PRESSURE: 141 MMHG | OXYGEN SATURATION: 98 %

## 2022-11-24 DIAGNOSIS — J02.9 SORE THROAT: Primary | ICD-10-CM

## 2022-11-24 DIAGNOSIS — J06.9 VIRAL URI WITH COUGH: ICD-10-CM

## 2022-11-24 LAB
DEPRECATED S PYO AG THROAT QL EIA: NEGATIVE
FLUAV AG SPEC QL IA: NEGATIVE
FLUBV AG SPEC QL IA: NEGATIVE

## 2022-11-24 PROCEDURE — U0005 INFEC AGEN DETEC AMPLI PROBE: HCPCS | Performed by: PHYSICIAN ASSISTANT

## 2022-11-24 PROCEDURE — 99213 OFFICE O/P EST LOW 20 MIN: CPT | Mod: CS | Performed by: PHYSICIAN ASSISTANT

## 2022-11-24 PROCEDURE — 87804 INFLUENZA ASSAY W/OPTIC: CPT | Performed by: PHYSICIAN ASSISTANT

## 2022-11-24 PROCEDURE — U0003 INFECTIOUS AGENT DETECTION BY NUCLEIC ACID (DNA OR RNA); SEVERE ACUTE RESPIRATORY SYNDROME CORONAVIRUS 2 (SARS-COV-2) (CORONAVIRUS DISEASE [COVID-19]), AMPLIFIED PROBE TECHNIQUE, MAKING USE OF HIGH THROUGHPUT TECHNOLOGIES AS DESCRIBED BY CMS-2020-01-R: HCPCS | Performed by: PHYSICIAN ASSISTANT

## 2022-11-24 PROCEDURE — 87651 STREP A DNA AMP PROBE: CPT | Performed by: PHYSICIAN ASSISTANT

## 2022-11-24 RX ORDER — FLUTICASONE PROPIONATE 50 MCG
2 SPRAY, SUSPENSION (ML) NASAL DAILY
Qty: 16 G | Refills: 0 | Status: SHIPPED | OUTPATIENT
Start: 2022-11-24

## 2022-11-24 NOTE — PROGRESS NOTES
Assessment & Plan     1. Sore throat  - Streptococcus A Rapid Screen w/Reflex to PCR - Clinic Collect  - Influenza A/B antigen  - Symptomatic; Unknown COVID-19 Virus (Coronavirus) by PCR Nose; Future  - Symptomatic; Unknown COVID-19 Virus (Coronavirus) by PCR Nose  - fluticasone (FLONASE) 50 MCG/ACT nasal spray; Spray 2 sprays into both nostrils daily  Dispense: 16 g; Refill: 0  - Group A Streptococcus PCR Throat Swab    2. Viral URI with cough    On exam, lungs are CTAB without sign of respiratory distress. Throat without PTA or RPA and TM clear B/L. No nuchal rigidity.  Suspect viral URI  Strep test negative, Influenza test negative  Supportive cares fluids and rest   Flonase for DENIS        Return in about 3 days (around 11/27/2022), or if symptoms worsen or fail to improve.    Diagnosis and treatment plan was reviewed with patient and/or family.   We went over any labs or imaging. Discussed worsening symptoms or little to no relief despite treatment plan to follow-up with PCP or return to clinic.  Patient verbalizes understanding. All questions were addressed and answered.     Concepcion Cervantes PA-C  [facility]    CHIEF COMPLAINT:   Chief Complaint   Patient presents with     Slurred Speech     Daughter had flu and now she's feeling sick. Painful sore throat, cough and ear pain also has a mild fever and is tired. Swollen lymphoids      Subjective     Alexsandra is a 32 year old female who presents to clinic today for evaluation of sore throat, cough, runny nose, fever and chills. Started 3-4 days ago.   Daughter had influenza about one week ago.       Past Medical History:   Diagnosis Date     Encounter for IUD insertion 09/16/2021    Ksenia placed by Dr. Mallory Hawkins     LSIL (low grade squamous intraepithelial lesion) on Pap smear 07/01/2014    age 23     PCOS (polycystic ovarian syndrome)      Vitamin D deficiency      Past Surgical History:   Procedure Laterality Date     DILATION AND  CURETTAGE SUCTION N/A 4/7/2015    Procedure: DILATION AND CURETTAGE SUCTION;  Surgeon: Hafsa Myers MD;  Location:  OR     Social History     Tobacco Use     Smoking status: Never     Smokeless tobacco: Never   Substance Use Topics     Alcohol use: No     Current Outpatient Medications   Medication     fluticasone (FLONASE) 50 MCG/ACT nasal spray     levonorgestrel (KYLEENA) 19.5 MG IUD     No current facility-administered medications for this visit.     No Known Allergies    10 point ROS of systems were all negative except for pertinent positives noted in my HPI.      Exam:   BP (!) 141/97   Pulse 106   Temp 98.9  F (37.2  C)   SpO2 98%   Constitutional: healthy, alert and no distress  Head: Normocephalic, atraumatic.  Eyes: conjunctiva clear, no drainage  ENT: TM with effusion B/L, nasal mucosa pink and moist, throat without tonsillar hypertrophy or erythema  Neck: neck is supple, no cervical lymphadenopathy or nuchal rigidity  Cardiovascular: RRR  Respiratory: CTA bilaterally, no rhonchi or rales  Skin: no rashes  Neurologic: Speech clear, gait normal. Moves all extremities.    Results for orders placed or performed in visit on 11/24/22   Streptococcus A Rapid Screen w/Reflex to PCR - Clinic Collect     Status: Normal    Specimen: Throat; Swab   Result Value Ref Range    Group A Strep antigen Negative Negative   Influenza A/B antigen     Status: Normal    Specimen: Nose; Swab   Result Value Ref Range    Influenza A antigen Negative Negative    Influenza B antigen Negative Negative    Narrative    Test results must be correlated with clinical data. If necessary, results should be confirmed by a molecular assay or viral culture.

## 2022-11-25 LAB
GROUP A STREP BY PCR: NOT DETECTED
SARS-COV-2 RNA RESP QL NAA+PROBE: NEGATIVE

## 2022-11-28 ENCOUNTER — VIRTUAL VISIT (OUTPATIENT)
Dept: FAMILY MEDICINE | Facility: CLINIC | Age: 32
End: 2022-11-28
Payer: COMMERCIAL

## 2022-11-28 DIAGNOSIS — J40 BRONCHITIS: ICD-10-CM

## 2022-11-28 DIAGNOSIS — J10.1 INFLUENZA A: Primary | ICD-10-CM

## 2022-11-28 PROCEDURE — 99214 OFFICE O/P EST MOD 30 MIN: CPT | Mod: 95 | Performed by: INTERNAL MEDICINE

## 2022-11-28 RX ORDER — BENZONATATE 100 MG/1
100 CAPSULE ORAL 3 TIMES DAILY PRN
Qty: 30 CAPSULE | Refills: 0 | Status: SHIPPED | OUTPATIENT
Start: 2022-11-28

## 2022-11-28 RX ORDER — GUAIFENESIN/DEXTROMETHORPHAN 100-10MG/5
10 SYRUP ORAL EVERY 4 HOURS PRN
Qty: 473 ML | Refills: 0 | Status: SHIPPED | OUTPATIENT
Start: 2022-11-28

## 2022-11-28 NOTE — PROGRESS NOTES
Alexsandra is a 32 year old who is being evaluated via a billable video visit.      How would you like to obtain your AVS? MyChart  If the video visit is dropped, the invitation should be resent by: Text to cell phone: [phone]  Will anyone else be joining your video visit? Yes: SAME NUMBER. How would they like to receive their invitation? Text to cell phone: [phone]          Assessment & Plan     Influenza A  A week ago her daughter got sick  She was diagnosed with influenza A  She took care of her daughter and after that started having fever and some chills and cough and runny nose  She was tested for influenza A at that time and was negative  She has no fever now but has cough and still some runny nose  She got tested positive again for influenza A today as her  works in the lab he did  No fevers now  Explained that it is too late for treatment with Tamiflu since the symptoms have been going for 1 week and as she has no fevers no    Bronchitis  Complaining of some cough and sputum  Sputum is yellow color  Certainly she has bronchitis and she might have secondary bacterial infection  We will try a course of Augmentin  - amoxicillin-clavulanate (AUGMENTIN) 875-125 MG tablet; Take 1 tablet by mouth 2 times daily  - benzonatate (TESSALON) 100 MG capsule; Take 1 capsule (100 mg) by mouth 3 times daily as needed for cough  - guaiFENesin-dextromethorphan (ROBITUSSIN DM) 100-10 MG/5ML syrup; Take 10 mLs by mouth every 4 hours as needed for cough      30 minutes spent on the date of the encounter doing chart review, history and exam, documentation and further activities per the note           Return in about 4 weeks (around 12/26/2022), or if symptoms worsen or fail to improve.    Yobani Alvarez MD  [facility]   Alexsandra is a 32 year old, presenting for the following health issues:  No chief complaint on file.      History of Present Illness       Reason for visit:  Follow up  on my recent Urgent Care visit. I still cough a lot and want to check with provider.    She eats 0-1 servings of fruits and vegetables daily.She consumes 1 sweetened beverage(s) daily.She exercises with enough effort to increase her heart rate 9 or less minutes per day.  She exercises with enough effort to increase her heart rate 3 or less days per week.   She is taking medications regularly.             Review of Systems   Constitutional, HEENT, cardiovascular, pulmonary, gi and gu systems are negative, except as otherwise noted.      Objective           Vitals:  No vitals were obtained today due to virtual visit.    Physical Exam   GENERAL: Healthy, alert and no distress  EYES: Eyes grossly normal to inspection.  No discharge or erythema, or obvious scleral/conjunctival abnormalities.  RESP: No audible wheeze, cough, or visible cyanosis.  No visible retractions or increased work of breathing.    SKIN: Visible skin clear. No significant rash, abnormal pigmentation or lesions.  NEURO: Cranial nerves grossly intact.  Mentation and speech appropriate for age.  PSYCH: Mentation appears normal, affect normal/bright, judgement and insight intact, normal speech and appearance well-groomed.                Video-Visit Details    Video Start Time: 450 PM    Type of service:  Video Visit    Video End Time:5:00 PM    Originating Location (pt. Location): Home        Distant Location (provider location):  Off-site    Platform used for Video Visit: [employer]

## 2022-12-16 ENCOUNTER — ANCILLARY PROCEDURE (OUTPATIENT)
Dept: GENERAL RADIOLOGY | Facility: CLINIC | Age: 32
End: 2022-12-16
Attending: PHYSICIAN ASSISTANT
Payer: COMMERCIAL

## 2022-12-16 ENCOUNTER — OFFICE VISIT (OUTPATIENT)
Dept: URGENT CARE | Facility: URGENT CARE | Age: 32
End: 2022-12-16
Payer: COMMERCIAL

## 2022-12-16 VITALS
RESPIRATION RATE: 20 BRPM | TEMPERATURE: 98 F | SYSTOLIC BLOOD PRESSURE: 122 MMHG | HEART RATE: 78 BPM | DIASTOLIC BLOOD PRESSURE: 85 MMHG | OXYGEN SATURATION: 98 %

## 2022-12-16 DIAGNOSIS — J20.9 ACUTE BRONCHITIS, UNSPECIFIED ORGANISM: ICD-10-CM

## 2022-12-16 DIAGNOSIS — R05.2 SUBACUTE COUGH: Primary | ICD-10-CM

## 2022-12-16 DIAGNOSIS — R05.2 SUBACUTE COUGH: ICD-10-CM

## 2022-12-16 DIAGNOSIS — H10.33 ACUTE CONJUNCTIVITIS OF BOTH EYES, UNSPECIFIED ACUTE CONJUNCTIVITIS TYPE: ICD-10-CM

## 2022-12-16 LAB
ERYTHROCYTE [DISTWIDTH] IN BLOOD BY AUTOMATED COUNT: 12.1 % (ref 10–15)
HCT VFR BLD AUTO: 43.9 % (ref 35–47)
HGB BLD-MCNC: 14.4 G/DL (ref 11.7–15.7)
MCH RBC QN AUTO: 29.9 PG (ref 26.5–33)
MCHC RBC AUTO-ENTMCNC: 32.8 G/DL (ref 31.5–36.5)
MCV RBC AUTO: 91 FL (ref 78–100)
PLATELET # BLD AUTO: 423 10E3/UL (ref 150–450)
RBC # BLD AUTO: 4.81 10E6/UL (ref 3.8–5.2)
WBC # BLD AUTO: 8 10E3/UL (ref 4–11)

## 2022-12-16 PROCEDURE — 99214 OFFICE O/P EST MOD 30 MIN: CPT | Performed by: PHYSICIAN ASSISTANT

## 2022-12-16 PROCEDURE — 71046 X-RAY EXAM CHEST 2 VIEWS: CPT | Mod: TC | Performed by: RADIOLOGY

## 2022-12-16 PROCEDURE — 85027 COMPLETE CBC AUTOMATED: CPT | Performed by: PHYSICIAN ASSISTANT

## 2022-12-16 PROCEDURE — 36415 COLL VENOUS BLD VENIPUNCTURE: CPT | Performed by: PHYSICIAN ASSISTANT

## 2022-12-16 RX ORDER — CETIRIZINE HYDROCHLORIDE 10 MG/1
10 TABLET ORAL DAILY
Qty: 30 TABLET | Refills: 0 | Status: SHIPPED | OUTPATIENT
Start: 2022-12-16

## 2022-12-16 RX ORDER — ALBUTEROL SULFATE 90 UG/1
2 AEROSOL, METERED RESPIRATORY (INHALATION) EVERY 6 HOURS PRN
Qty: 18 G | Refills: 0 | Status: SHIPPED | OUTPATIENT
Start: 2022-12-16

## 2022-12-16 RX ORDER — POLYMYXIN B SULFATE AND TRIMETHOPRIM 1; 10000 MG/ML; [USP'U]/ML
1 SOLUTION OPHTHALMIC EVERY 6 HOURS
Qty: 10 ML | Refills: 0 | Status: SHIPPED | OUTPATIENT
Start: 2022-12-16 | End: 2022-12-21

## 2022-12-16 NOTE — PATIENT INSTRUCTIONS
Start taking Zyrtec daily for cough.  Okay to use albuterol every 4-6 hours as needed if development of shortness of breath.  Use lubricating drops (Systane.) a prescription for polytrim to use if having purulent discharge from eyes.   Fluids, rest and humidified air at night

## 2022-12-16 NOTE — PROGRESS NOTES
Assessment & Plan     1. Subacute cough  - XR Chest 2 Views; Future  - CBC with platelets; Future  - CBC with platelets    2. Acute bronchitis, unspecified organism  - cetirizine (ZYRTEC) 10 MG tablet; Take 1 tablet (10 mg) by mouth daily  Dispense: 30 tablet; Refill: 0  - albuterol (PROAIR HFA/PROVENTIL HFA/VENTOLIN HFA) 108 (90 Base) MCG/ACT inhaler; Inhale 2 puffs into the lungs every 6 hours as needed for shortness of breath, wheezing or cough  Dispense: 18 g; Refill: 0    3. Acute conjunctivitis of both eyes, unspecified acute conjunctivitis type  - trimethoprim-polymyxin b (POLYTRIM) 70236-6.1 UNIT/ML-% ophthalmic solution; Place 1 drop into both eyes every 6 hours for 5 days  Dispense: 10 mL; Refill: 0    Patient with cough for the past 3 weeks.  On examination, she appears well.  Vital signs are stable.  She is not hypoxic, tachycardic.  No laboratory evidence of leukocytosis, anemia etc.  Chest x-ray is normal per my read, pending radiology report.  I suspect her protracted cough is post viral cough or a bronchitis.  She reports that Benadryl has been helping her cough, although she does not like the sedating nature of it, will try Zyrtec, along with albuterol.  At this point, she does not have conjunctivitis, but has had intermittent pus draining from her eyes, discussed that if this were to return to start the Polytrim for 5 days.  Return precautions discussed.    Return in about 5 days (around 12/21/2022), or if symptoms worsen or fail to improve.    Diagnosis and treatment plan was reviewed with patient and/or family.   We went over any labs or imaging. Discussed worsening symptoms or little to no relief despite treatment plan to follow-up with PCP or return to clinic.  Patient verbalizes understanding. All questions were addressed and answered.     Concepcion Cervantes PA-C  [facility]    CHIEF COMPLAINT:   Chief Complaint   Patient presents with     Cough     Pt had the flu and  now has a bad cough      Subjective     Alexsandra is a 32 year old female who presents to clinic today for evaluation. Symptoms started about 3 weeks ago with influenza like illness.   She was prescribed Augmentin, and felt like that improved her cough. Little pain in the chest with coughing.   Benadryl seems to help, but is sedating for her. Cough is worse at night..  In the morning, Little bit at the tip of her finger little green.  No fever noted.   No hemoptysis or shortness of breath.       Past Medical History:   Diagnosis Date     Encounter for IUD insertion 09/16/2021    Kyleena placed by Dr. Mallory Hawkins     LSIL (low grade squamous intraepithelial lesion) on Pap smear 07/01/2014    age 23     PCOS (polycystic ovarian syndrome)      Vitamin D deficiency      Past Surgical History:   Procedure Laterality Date     DILATION AND CURETTAGE SUCTION N/A 4/7/2015    Procedure: DILATION AND CURETTAGE SUCTION;  Surgeon: Hafsa Myers MD;  Location:  OR     Social History     Tobacco Use     Smoking status: Never     Smokeless tobacco: Never   Substance Use Topics     Alcohol use: No     Current Outpatient Medications   Medication     albuterol (PROAIR HFA/PROVENTIL HFA/VENTOLIN HFA) 108 (90 Base) MCG/ACT inhaler     cetirizine (ZYRTEC) 10 MG tablet     trimethoprim-polymyxin b (POLYTRIM) 85594-0.1 UNIT/ML-% ophthalmic solution     amoxicillin-clavulanate (AUGMENTIN) 875-125 MG tablet     benzonatate (TESSALON) 100 MG capsule     fluticasone (FLONASE) 50 MCG/ACT nasal spray     guaiFENesin-dextromethorphan (ROBITUSSIN DM) 100-10 MG/5ML syrup     levonorgestrel (KYLEENA) 19.5 MG IUD     No current facility-administered medications for this visit.     No Known Allergies    10 point ROS of systems were all negative except for pertinent positives noted in my HPI.      Exam:   /85   Pulse 78   Temp 98  F (36.7  C)   Resp 20   SpO2 98%   Constitutional: healthy, alert and no distress  Head: Normocephalic,  atraumatic.  Eyes: conjunctiva slightly injected B/L.   ENT: TMs clear and shiny fernando, nasal mucosa pink and moist, throat without tonsillar hypertrophy or erythema  Neck: neck is supple, no cervical lymphadenopathy or nuchal rigidity  Cardiovascular: RRR  Respiratory: CTA bilaterally, no rhonchi or rales  Skin: no rashes  Neurologic: Speech clear, gait normal. Moves all extremities.    Results for orders placed or performed in visit on 12/16/22   CBC with platelets     Status: Normal   Result Value Ref Range    WBC Count 8.0 4.0 - 11.0 10e3/uL    RBC Count 4.81 3.80 - 5.20 10e6/uL    Hemoglobin 14.4 11.7 - 15.7 g/dL    Hematocrit 43.9 35.0 - 47.0 %    MCV 91 78 - 100 fL    MCH 29.9 26.5 - 33.0 pg    MCHC 32.8 31.5 - 36.5 g/dL    RDW 12.1 10.0 - 15.0 %    Platelet Count 423 150 - 450 10e3/uL     CXR -- Negative per my read, pending radiology report

## 2023-04-01 ENCOUNTER — OFFICE VISIT (OUTPATIENT)
Dept: URGENT CARE | Facility: URGENT CARE | Age: 33
End: 2023-04-01
Payer: COMMERCIAL

## 2023-04-01 VITALS
HEART RATE: 95 BPM | SYSTOLIC BLOOD PRESSURE: 126 MMHG | WEIGHT: 140.1 LBS | DIASTOLIC BLOOD PRESSURE: 79 MMHG | TEMPERATURE: 98.6 F | BODY MASS INDEX: 25.83 KG/M2 | OXYGEN SATURATION: 98 %

## 2023-04-01 DIAGNOSIS — R07.0 THROAT PAIN: Primary | ICD-10-CM

## 2023-04-01 DIAGNOSIS — Z20.818 STREP THROAT EXPOSURE: ICD-10-CM

## 2023-04-01 LAB
DEPRECATED S PYO AG THROAT QL EIA: NEGATIVE
GROUP A STREP BY PCR: DETECTED

## 2023-04-01 PROCEDURE — 99213 OFFICE O/P EST LOW 20 MIN: CPT | Performed by: PHYSICIAN ASSISTANT

## 2023-04-01 PROCEDURE — 87651 STREP A DNA AMP PROBE: CPT | Performed by: PHYSICIAN ASSISTANT

## 2023-04-01 RX ORDER — AMOXICILLIN 875 MG
875 TABLET ORAL 2 TIMES DAILY
Qty: 20 TABLET | Refills: 0 | Status: SHIPPED | OUTPATIENT
Start: 2023-04-01 | End: 2023-04-11

## 2023-04-01 RX ORDER — ACETAMINOPHEN 500 MG
500-1000 TABLET ORAL EVERY 6 HOURS PRN
COMMUNITY

## 2023-04-01 NOTE — PROGRESS NOTES
SUBJECTIVE:  Alexsandra Westbrook is a 32 year old female who comes in with a 4-day history of sore throat and low-grade fevers.  She has a 2-year-old daughter with a similar symptoms and tested positive for strep throat.  She denies any significant headache, cough or cold symptoms.  No GI symptoms or rashes are noted.  Still able to eat well.  Using over-the-counter med for symptomatic relief.  She is otherwise at baseline health    Past Medical History:   Diagnosis Date     Encounter for IUD insertion 09/16/2021    Kyleena placed by Dr. Mallory Hawkins     LSIL (low grade squamous intraepithelial lesion) on Pap smear 07/01/2014    age 23     PCOS (polycystic ovarian syndrome)      Vitamin D deficiency      Current Outpatient Medications   Medication     acetaminophen (TYLENOL) 500 MG tablet     albuterol (PROAIR HFA/PROVENTIL HFA/VENTOLIN HFA) 108 (90 Base) MCG/ACT inhaler     amoxicillin-clavulanate (AUGMENTIN) 875-125 MG tablet     benzonatate (TESSALON) 100 MG capsule     cetirizine (ZYRTEC) 10 MG tablet     fluticasone (FLONASE) 50 MCG/ACT nasal spray     guaiFENesin-dextromethorphan (ROBITUSSIN DM) 100-10 MG/5ML syrup     levonorgestrel (KYLEENA) 19.5 MG IUD     No current facility-administered medications for this visit.     Social History     Socioeconomic History     Marital status: Single     Spouse name: Not on file     Number of children: Not on file     Years of education: Not on file     Highest education level: Not on file   Occupational History     Not on file   Tobacco Use     Smoking status: Never     Smokeless tobacco: Never   Substance and Sexual Activity     Alcohol use: No     Drug use: No     Sexual activity: Yes     Partners: Male     Birth control/protection: None   Other Topics Concern     Not on file   Social History Narrative    Caffeine intake/servings daily - 0    Calcium intake/servings daily - 3    Exercise 3 times weekly - describe treadmill    Sunscreen used - Yes    Seatbelts used - Yes    Guns  stored in the home - No    Self Breast Exam - No    Pap test up to date -  Yes    Eye exam up to date -  Yes    Dental exam up to date -  Yes    DEXA scan up to date -  No    Flex Sig/Colonoscopy up to daCte -  No    Mammography up to date -  No    Immunizations reviewed and up to date - Yes    Abuse: Current or Past (Physical, Sexual or Emotional) - No    Do you feel safe in your environment - Yes    Do you cope well with stress - Yes    Do you suffer from insomnia - Yes    Last updated by: Dipika Calvo  3/20/2015         Social Determinants of Health     Financial Resource Strain: Not on file   Food Insecurity: Not on file   Transportation Needs: Not on file   Physical Activity: Not on file   Stress: Not on file   Social Connections: Not on file   Intimate Partner Violence: Not on file   Housing Stability: Not on file     ROS negative other than stated above    Exam:  GENERAL APPEARANCE: healthy, alert and no distress  EYES: EOMI,  PERRL  HENT: TMs canals clear bilaterally.  Oral mucosa moist with mild erythema noted no exudate is noted.  Uvula is midline  NECK: no adenopathy, no asymmetry, masses, or scars and thyroid normal to palpation  RESP: lungs clear to auscultation - no rales, rhonchi or wheezes  CV: regular rates and rhythm, normal S1 S2, no S3 or S4 and no murmur, click or rub -  SKIN: no suspicious lesions or rashes    Results for orders placed or performed in visit on 04/01/23   Streptococcus A Rapid Screen w/Reflex to PCR - Clinic Collect     Status: Normal    Specimen: Throat; Swab   Result Value Ref Range    Group A Strep antigen Negative Negative     assessment/plan:  (R07.0) Throat pain  (primary encounter diagnosis)  Comment:     Plan: Streptococcus A Rapid Screen w/Reflex to PCR -         Clinic Collect, Group A Streptococcus PCR         Throat Swab            (Z20.818) Strep throat exposure  Comment:   Plan: amoxicillin (AMOXIL) 875 MG tablet          Patient with a 4-day history  of sore throat along with low-grade fevers early on.  2-year-old daughter tested positive for strep today.  She is very concerned that she will get this.  Would like treatment.  We will treat with amoxicillin.  Med as directed.  Over-the-counter med as needed for symptomatic relief.  Follow-up with primary symptoms worsen or new symptoms develop

## 2023-04-30 ENCOUNTER — HEALTH MAINTENANCE LETTER (OUTPATIENT)
Age: 33
End: 2023-04-30

## 2023-06-28 ENCOUNTER — OFFICE VISIT (OUTPATIENT)
Dept: MIDWIFE SERVICES | Facility: CLINIC | Age: 33
End: 2023-06-28
Payer: COMMERCIAL

## 2023-06-28 VITALS — DIASTOLIC BLOOD PRESSURE: 80 MMHG | BODY MASS INDEX: 25.63 KG/M2 | WEIGHT: 139 LBS | SYSTOLIC BLOOD PRESSURE: 120 MMHG

## 2023-06-28 DIAGNOSIS — Z30.011 ENCOUNTER FOR INITIAL PRESCRIPTION OF CONTRACEPTIVE PILLS: ICD-10-CM

## 2023-06-28 DIAGNOSIS — N89.8 VAGINAL IRRITATION: ICD-10-CM

## 2023-06-28 DIAGNOSIS — Z30.432 ENCOUNTER FOR REMOVAL OF INTRAUTERINE CONTRACEPTIVE DEVICE: Primary | ICD-10-CM

## 2023-06-28 LAB
CLUE CELLS: ABNORMAL
TRICHOMONAS, WET PREP: ABNORMAL
WBC'S/HIGH POWER FIELD, WET PREP: ABNORMAL
YEAST, WET PREP: ABNORMAL

## 2023-06-28 PROCEDURE — 99214 OFFICE O/P EST MOD 30 MIN: CPT | Mod: 25 | Performed by: ADVANCED PRACTICE MIDWIFE

## 2023-06-28 PROCEDURE — 87491 CHLMYD TRACH DNA AMP PROBE: CPT | Performed by: ADVANCED PRACTICE MIDWIFE

## 2023-06-28 PROCEDURE — 87210 SMEAR WET MOUNT SALINE/INK: CPT | Performed by: ADVANCED PRACTICE MIDWIFE

## 2023-06-28 PROCEDURE — 58301 REMOVE INTRAUTERINE DEVICE: CPT | Performed by: ADVANCED PRACTICE MIDWIFE

## 2023-06-28 PROCEDURE — 87591 N.GONORRHOEAE DNA AMP PROB: CPT | Performed by: ADVANCED PRACTICE MIDWIFE

## 2023-06-28 RX ORDER — DESOGESTREL AND ETHINYL ESTRADIOL 0.15-0.03
1 KIT ORAL DAILY
Qty: 28 TABLET | Refills: 2 | Status: SHIPPED | OUTPATIENT
Start: 2023-06-28

## 2023-06-28 NOTE — PROGRESS NOTES
SUBJECTIVE:                                                   Alexsandra Westbrook is a 32 year old who presents to clinic today for the following health issue(s):  Patient presents with:  IUD: Kyleena removal, discuss changing to pill or patch    Patient is here today with an  via Ipad    HPI:  Alexsandra presents today for IUD removal. Had had the Kyleena since . She gets a menses every 4 months or so. She would like to switch to OCPs so her menses will be regular. She would also like to be checked for infection as she has noted some vaginal irritation and a small amount of vaginal discharge.     No LMP recorded. (Menstrual status: IUD).  Menstrual History: frequency: every 4 months or so  Patient is sexually active  .  Using IUD for contraception.   Health maintenance updated:  yes  STI infx testing offered:  Accepted    Last PHQ-9 score on record =     2016    11:18 AM   PHQ-9 SCORE   PHQ-9 Total Score 0     Last GAD7 score on record =        No data to display                  Problem list and histories reviewed & adjusted, as indicated.  Additional history: as documented.    Patient Active Problem List   Diagnosis     Vitamin D deficiency     PCOS (polycystic ovarian syndrome)     Helicobacter pylori gastritis     Thyromegaly     Irregular periods/menstrual cycles     Migraine with aura and without status migrainosus, not intractable     IUD (intrauterine device) in place     Past Surgical History:   Procedure Laterality Date     DILATION AND CURETTAGE SUCTION N/A 2015    Procedure: DILATION AND CURETTAGE SUCTION;  Surgeon: Hafsa Myers MD;  Location:  OR      Social History     Tobacco Use     Smoking status: Never     Smokeless tobacco: Never   Substance Use Topics     Alcohol use: No      Problem (# of Occurrences) Relation (Name,Age of Onset)    Diabetes (1) Maternal Grandmother    Cerebrovascular Disease (1) Maternal Grandfather: old age    Thyroid Disease (1) Mother: enlarged,  hypothyroidism            Current Outpatient Medications   Medication Sig     desogestrel-ethinyl estradiol (APRI) 0.15-30 MG-MCG tablet Take 1 tablet by mouth daily     acetaminophen (TYLENOL) 500 MG tablet Take 500-1,000 mg by mouth every 6 hours as needed for mild pain (Patient not taking: Reported on 6/28/2023)     albuterol (PROAIR HFA/PROVENTIL HFA/VENTOLIN HFA) 108 (90 Base) MCG/ACT inhaler Inhale 2 puffs into the lungs every 6 hours as needed for shortness of breath, wheezing or cough (Patient not taking: Reported on 4/1/2023)     amoxicillin-clavulanate (AUGMENTIN) 875-125 MG tablet Take 1 tablet by mouth 2 times daily (Patient not taking: Reported on 12/16/2022)     benzonatate (TESSALON) 100 MG capsule Take 1 capsule (100 mg) by mouth 3 times daily as needed for cough (Patient not taking: Reported on 4/1/2023)     cetirizine (ZYRTEC) 10 MG tablet Take 1 tablet (10 mg) by mouth daily (Patient not taking: Reported on 4/1/2023)     fluticasone (FLONASE) 50 MCG/ACT nasal spray Spray 2 sprays into both nostrils daily (Patient not taking: Reported on 4/1/2023)     guaiFENesin-dextromethorphan (ROBITUSSIN DM) 100-10 MG/5ML syrup Take 10 mLs by mouth every 4 hours as needed for cough (Patient not taking: Reported on 4/1/2023)     levonorgestrel (KYLEENA) 19.5 MG IUD 1 each (19.5 mg) by Intrauterine route once (Patient not taking: Reported on 4/1/2023)     No current facility-administered medications for this visit.     No Known Allergies    ROS:  CONSTITUTIONAL: NEGATIVE for fever, chills, change in weight  BREAST: NEGATIVE for masses, tenderness or discharge  GI: NEGATIVE for nausea, abdominal pain, heartburn, or change in bowel habits  : NEGATIVE for unusual urinary symptoms. Periods are irregular.  POSITIVE for vaginal irritation and discharge.  NEURO: NEGATIVE for weakness, dizziness or paresthesias  HEME/ALLERGY/IMMUNE: NEGATIVE for bleeding problems  PSYCHIATRIC: NEGATIVE for changes in mood or  affect    OBJECTIVE:     /80   Wt 63 kg (139 lb)   BMI 25.63 kg/m    Body mass index is 25.63 kg/m .    PHYSICAL EXAM:  Constitutional:  Appearance: Well nourished, well developed alert, in no acute distress  Chest:  Respiratory Effort:  Breathing unlabored.   Neurologic:  Mental Status:  Oriented X3.  Normal strength and tone, sensory exam grossly normal, mentation intact and speech normal.    Psychiatric:  Mentation appears normal and affect normal/bright.     Pelvic Exam:  Vulva: No external lesions, normal hair distribution, no adenopathy  Vagina: Moist, pink, no abnormal discharge, well rugated, no lesions, swab collected for GC/Chlam and wet prep  Cervix: smooth, pink, no visible lesions, strings visualized at cervical os  Uterus: Deferred  Ovaries: Deferred  Rectal exam: Deferred    In-Clinic Test Results:  No results found for this or any previous visit (from the past 24 hour(s)).    ASSESSMENT/PLAN:                                                        ICD-10-CM    1. Encounter for removal of intrauterine contraceptive device  Z30.432 REMOVE INTRAUTERINE DEVICE      2. Encounter for initial prescription of contraceptive pills  Z30.011 desogestrel-ethinyl estradiol (APRI) 0.15-30 MG-MCG tablet      3. Vaginal irritation  N89.8 Wet prep - Clinic Collect     NEISSERIA GONORRHOEA PCR     CHLAMYDIA TRACHOMATIS PCR          PLAN:    1. Procedure explained and consent signed. Procedure:  Strings grasped with ring forceps and IUD easily removed, intact.   Patient tolerated well, no complications.    2. The use of the oral contraceptive pill has been fully discussed with the patient. This includes the proper method to initiate  and continue the pill, the need for regular compliance to ensure adequate contraceptive effect, the physiology which makes the pill effective, the instructions for what to do in event of a missed pill, and warnings about anticipated minor side effects such as breakthrough spotting,  nausea, breast tenderness, weight changes, acne, headaches, etc. She was informed of the irregular bleeding pattern that can occur when the pill is first started or a new form is changed over for the first 2-3 months.  She has been told of the more serious potential side effects such as MI, stroke, and deep vein thrombosis, all of which are very unlikely.  She has been asked to report any signs of such serious problems immediately.   She understands and wishes to take the medication as prescribed.    3. Labs pending. Will advise patient of results when available.       KIANA Castro, CNM

## 2023-06-28 NOTE — NURSING NOTE
"Chief Complaint   Patient presents with     IUD     Kyleena removal, discuss changing to pill or patch       Initial /80   Wt 63 kg (139 lb)   BMI 25.63 kg/m   Estimated body mass index is 25.63 kg/m  as calculated from the following:    Height as of 21: 1.568 m (5' 1.75\").    Weight as of this encounter: 63 kg (139 lb).  BP completed using cuff size: regular    Questioned patient about current smoking habits.  Pt. has never smoked.            "

## 2023-06-28 NOTE — PATIENT INSTRUCTIONS
Birth Control Pills    Combination birth control pills contain both estrogen and progestin.  There are numerous brands of birth control pills otherwise known as oral contraceptive pills (OCP's).  Each brand has a different combination of estrogen and progestin so every woman can find the one that is right for her.  OCP's are a safe and effective way to prevent pregnancy in most women.    How do OCP's work  OCP's work by several different mechanisms.  They cause changes in the cervix and the lining of the uterus.  The cervical mucus becomes thicker which will prevent the sperm from entering the cervix.  The lining of the uterus becomes thin which helps prevent an egg from attaching to it.  In combination, these events make it unlikely that you will get pregnant. It may also prevent ovulation completely.    Benefits of OCP's  May reduce your risk of:  Cancer of the uterus and ovary, ovarian cysts, pelvic infection, bone loss, benign breast disease, anemia, ectopic pregnancy and acne.  It may also decrease symptoms of PCOS (Polycystic Ovarian Syndrome). OCP's may also improve cramping during menstrual cycle and may make you cycle shorter and lighter.    How to take OCP's  You have several choices on how to start taking your OCP's:  You can start the pill on the first day of your next period  You can start the pill on the Sunday after your next period starts  You can start the pill on the first day it was prescribed no matter where you are in your cycle.  In this case, you will need to make sure you are not pregnant.    No matter when you start your first pack, you will always start your next pack on the same day you started your first pack.    You should take the pill at the same time every day.  Do not skip any pills.  If you miss any pills, are taking antibiotics or vomit, use a backup method of birth control until you get your next period.    Pills come in packs of 21, 28 or 91 pills:    21 Pills:  Take one pill at  the same time every day for 21 days.  Wait 7 days before beginning your next pack.  During these 7 days you will have your period.  28 Pills:  Take one pill at the same time every day for 28 days.  The last 7 pills in the pack do not contain estrogen/progestin.  During these 7 days you will have your period.    91 Pills:  Take one pill at the same time every day for 91 days.  The last 7 pills in the pack do not contain estrogen/progestin.  During these 7 days you will have your period.  With this method you will only have 4 periods a year.  Some women eventually have no bleeding at all.    Each pill pack comes with instructions.  Please make sure you read them and understand these instructions.      What to do if you miss a pill    Occasionally you may forget to take a pill or not take it on time.  Take the missed pill as soon as you remember.  Take the next pill at the regular time.  It is ok if you take two pills in one day.  You may feel a bit queasy or have some spotting, this is normal and should not be concerning.  If you have missed more than one pill use a back up method of birth control and call the clinic for instructions on how to proceed.    Who should not take Combined OCP's  If you have a history or have blood clots  A history of cerebral vascular accident (stroke)  If you have ischemic heart or coronary artery disease  Known of suspected breast cancer  Known or suspected pregnancy  Smoker and over age 35  Any know liver abnormality  Migraine headaches with an aura  Undiagnosed abnormal vaginal bleeding  High blood pressure    Common side effects when starting OCP's  Headache, nausea, dizziness, breakthrough bleeding, missed periods, tender breasts, depression and anxiety.  Most side effects are minor and resolve in the first few months. Take the pill with meals or at bedtime if nausea occurs.    Call or return for care in the following circumstances:    Unexpected missed periods or very heavy  bleeding  Persistent vaginal bleeding  Depression  Suspected pregnancy  Persistent side effects such as:  Nausea, irregular menses or mood changes.    Seek emergency care immediately for the following:  ACHES  Abdominal or pelvic pain  Chest pain  Severe headache   Visual disturbances  Severe leg pain or numbness or tingling of extremities    Lastly-  Use of a backup method is recommended for the first cycle  Condoms are recommended to protect against STI's  OCP's are 99% effective if take correctly.  The pill helps to keep your periods regular, lighter and shorter and reduces cramps.  If you desire a pregnancy, you may stop taking your OCPs.     Please call the clinic with questions and concerns  [facility]  [phone]

## 2023-06-29 LAB
C TRACH DNA SPEC QL NAA+PROBE: NEGATIVE
N GONORRHOEA DNA SPEC QL NAA+PROBE: NEGATIVE

## 2023-10-25 ENCOUNTER — IMMUNIZATION (OUTPATIENT)
Dept: NURSING | Facility: CLINIC | Age: 33
End: 2023-10-25
Payer: COMMERCIAL

## 2023-10-25 PROCEDURE — 90471 IMMUNIZATION ADMIN: CPT

## 2023-10-25 PROCEDURE — 90686 IIV4 VACC NO PRSV 0.5 ML IM: CPT

## 2024-07-07 ENCOUNTER — HEALTH MAINTENANCE LETTER (OUTPATIENT)
Age: 34
End: 2024-07-07

## 2024-10-15 ENCOUNTER — IMMUNIZATION (OUTPATIENT)
Dept: FAMILY MEDICINE | Facility: CLINIC | Age: 34
End: 2024-10-15
Payer: COMMERCIAL

## 2024-10-15 DIAGNOSIS — Z23 ENCOUNTER FOR IMMUNIZATION: Primary | ICD-10-CM

## 2024-10-15 PROCEDURE — 90656 IIV3 VACC NO PRSV 0.5 ML IM: CPT

## 2024-10-15 PROCEDURE — 99207 PR NO CHARGE NURSE ONLY: CPT

## 2024-10-15 PROCEDURE — 90471 IMMUNIZATION ADMIN: CPT

## 2024-10-15 NOTE — PROGRESS NOTES
Prior to immunization administration, verified patients identity using patient s name and date of birth. Please see Immunization Activity for additional information.     Is the patient's temperature normal (100.5 or less)? Yes     Patient MEETS CRITERIA. PROCEED with vaccine administration.      Patient instructed to remain in clinic for 15 minutes afterwards, and to report any adverse reactions.      Link to Ancillary Visit Immunization Standing Orders SmartSet     Screening performed by Evaristo Abebe MA on 10/15/2024 at 10:07 AM.

## 2025-07-19 ENCOUNTER — HEALTH MAINTENANCE LETTER (OUTPATIENT)
Age: 35
End: 2025-07-19